# Patient Record
Sex: FEMALE | Race: WHITE | NOT HISPANIC OR LATINO | Employment: FULL TIME | URBAN - METROPOLITAN AREA
[De-identification: names, ages, dates, MRNs, and addresses within clinical notes are randomized per-mention and may not be internally consistent; named-entity substitution may affect disease eponyms.]

---

## 2018-09-05 ENCOUNTER — APPOINTMENT (OUTPATIENT)
Dept: PREADMISSION TESTING | Facility: HOSPITAL | Age: 58
End: 2018-09-05

## 2018-09-05 ENCOUNTER — ANESTHESIA EVENT (OUTPATIENT)
Dept: PERIOP | Facility: HOSPITAL | Age: 58
End: 2018-09-05

## 2018-09-05 VITALS — HEIGHT: 66 IN | WEIGHT: 116.18 LBS | BODY MASS INDEX: 18.67 KG/M2

## 2018-09-05 LAB
DEPRECATED RDW RBC AUTO: 45.7 FL (ref 37–54)
ERYTHROCYTE [DISTWIDTH] IN BLOOD BY AUTOMATED COUNT: 13 % (ref 11.3–14.5)
GLUCOSE BLD-MCNC: 108 MG/DL (ref 70–100)
HBA1C MFR BLD: 5.9 % (ref 4.8–5.6)
HCT VFR BLD AUTO: 44.6 % (ref 34.5–44)
HGB BLD-MCNC: 14.6 G/DL (ref 11.5–15.5)
MCH RBC QN AUTO: 30.9 PG (ref 27–31)
MCHC RBC AUTO-ENTMCNC: 32.7 G/DL (ref 32–36)
MCV RBC AUTO: 94.5 FL (ref 80–99)
PLATELET # BLD AUTO: 300 10*3/MM3 (ref 150–450)
PMV BLD AUTO: 11 FL (ref 6–12)
RBC # BLD AUTO: 4.72 10*6/MM3 (ref 3.89–5.14)
WBC NRBC COR # BLD: 7.91 10*3/MM3 (ref 3.5–10.8)

## 2018-09-05 PROCEDURE — 82947 ASSAY GLUCOSE BLOOD QUANT: CPT | Performed by: ORTHOPAEDIC SURGERY

## 2018-09-05 PROCEDURE — 83036 HEMOGLOBIN GLYCOSYLATED A1C: CPT | Performed by: ORTHOPAEDIC SURGERY

## 2018-09-05 PROCEDURE — 36415 COLL VENOUS BLD VENIPUNCTURE: CPT

## 2018-09-05 PROCEDURE — 85027 COMPLETE CBC AUTOMATED: CPT | Performed by: ANESTHESIOLOGY

## 2018-09-05 RX ORDER — PHENOL 1.4 %
1200 AEROSOL, SPRAY (ML) MUCOUS MEMBRANE DAILY
COMMUNITY

## 2018-09-05 RX ORDER — FAMOTIDINE 10 MG/ML
20 INJECTION, SOLUTION INTRAVENOUS ONCE
Status: CANCELLED | OUTPATIENT
Start: 2018-09-05 | End: 2018-09-05

## 2018-09-05 RX ORDER — MELATONIN
2000 DAILY
COMMUNITY

## 2018-09-05 NOTE — DISCHARGE INSTRUCTIONS

## 2018-09-06 ENCOUNTER — APPOINTMENT (OUTPATIENT)
Dept: GENERAL RADIOLOGY | Facility: HOSPITAL | Age: 58
End: 2018-09-06

## 2018-09-06 ENCOUNTER — HOSPITAL ENCOUNTER (INPATIENT)
Facility: HOSPITAL | Age: 58
LOS: 3 days | Discharge: HOME | End: 2018-09-09
Attending: ORTHOPAEDIC SURGERY | Admitting: ORTHOPAEDIC SURGERY

## 2018-09-06 ENCOUNTER — ANESTHESIA (OUTPATIENT)
Dept: PERIOP | Facility: HOSPITAL | Age: 58
End: 2018-09-06

## 2018-09-06 DIAGNOSIS — Z74.09 IMPAIRED FUNCTIONAL MOBILITY, BALANCE, GAIT, AND ENDURANCE: Primary | ICD-10-CM

## 2018-09-06 DIAGNOSIS — Z78.9 IMPAIRED MOBILITY AND ADLS: ICD-10-CM

## 2018-09-06 DIAGNOSIS — Z74.09 IMPAIRED MOBILITY AND ADLS: ICD-10-CM

## 2018-09-06 DIAGNOSIS — Z98.1 S/P LUMBAR FUSION: ICD-10-CM

## 2018-09-06 PROBLEM — Z72.0 TOBACCO ABUSE: Status: ACTIVE | Noted: 2018-09-06

## 2018-09-06 PROBLEM — R73.03 PREDIABETES: Status: ACTIVE | Noted: 2018-09-06

## 2018-09-06 PROBLEM — M54.9 BACK PAIN: Status: ACTIVE | Noted: 2018-09-06

## 2018-09-06 LAB
GLUCOSE BLDC GLUCOMTR-MCNC: 129 MG/DL (ref 70–130)
GLUCOSE BLDC GLUCOMTR-MCNC: 291 MG/DL (ref 70–130)

## 2018-09-06 PROCEDURE — 25010000002 PROPOFOL 10 MG/ML EMULSION: Performed by: NURSE ANESTHETIST, CERTIFIED REGISTERED

## 2018-09-06 PROCEDURE — C1713 ANCHOR/SCREW BN/BN,TIS/BN: HCPCS | Performed by: ORTHOPAEDIC SURGERY

## 2018-09-06 PROCEDURE — 97161 PT EVAL LOW COMPLEX 20 MIN: CPT

## 2018-09-06 PROCEDURE — 25010000003 CEFAZOLIN IN DEXTROSE 2-4 GM/100ML-% SOLUTION: Performed by: ORTHOPAEDIC SURGERY

## 2018-09-06 PROCEDURE — 25810000003 SODIUM CHLORIDE 0.9 % WITH KCL 20 MEQ 20-0.9 MEQ/L-% SOLUTION: Performed by: ORTHOPAEDIC SURGERY

## 2018-09-06 PROCEDURE — 25010000002 ONDANSETRON PER 1 MG: Performed by: NURSE ANESTHETIST, CERTIFIED REGISTERED

## 2018-09-06 PROCEDURE — 76000 FLUOROSCOPY <1 HR PHYS/QHP: CPT

## 2018-09-06 PROCEDURE — 82962 GLUCOSE BLOOD TEST: CPT

## 2018-09-06 PROCEDURE — 76001 HC FLUORO GREATER THAN 1 HOUR: CPT

## 2018-09-06 PROCEDURE — 25010000002 ONDANSETRON PER 1 MG: Performed by: ORTHOPAEDIC SURGERY

## 2018-09-06 PROCEDURE — 97116 GAIT TRAINING THERAPY: CPT

## 2018-09-06 PROCEDURE — 25010000002 FENTANYL CITRATE (PF) 100 MCG/2ML SOLUTION: Performed by: NURSE ANESTHETIST, CERTIFIED REGISTERED

## 2018-09-06 PROCEDURE — 25010000002 DEXAMETHASONE PER 1 MG: Performed by: NURSE ANESTHETIST, CERTIFIED REGISTERED

## 2018-09-06 PROCEDURE — 25010000002 HYDROMORPHONE PER 4 MG: Performed by: NURSE ANESTHETIST, CERTIFIED REGISTERED

## 2018-09-06 PROCEDURE — 25010000002 MORPHINE PER 10 MG: Performed by: ORTHOPAEDIC SURGERY

## 2018-09-06 PROCEDURE — 25010000002 NEOSTIGMINE 10 MG/10ML SOLUTION: Performed by: NURSE ANESTHETIST, CERTIFIED REGISTERED

## 2018-09-06 PROCEDURE — 63710000001 INSULIN LISPRO (HUMAN) PER 5 UNITS: Performed by: NURSE PRACTITIONER

## 2018-09-06 DEVICE — SPACR OPAL REVOLVE 9X28X10MM: Type: IMPLANTABLE DEVICE | Site: SPINE LUMBAR | Status: FUNCTIONAL

## 2018-09-06 DEVICE — ALLOGRFT BONE VIVIGEN CELLUAR MATRX FORMABLE 5CC: Type: IMPLANTABLE DEVICE | Site: SPINE LUMBAR | Status: FUNCTIONAL

## 2018-09-06 DEVICE — SCRW CORT VIPER PLS5.5 TI FIX 6X45MM: Type: IMPLANTABLE DEVICE | Site: SPINE LUMBAR | Status: FUNCTIONAL

## 2018-09-06 DEVICE — WAX BONE HEMO AESCULAP 2.5GM: Type: IMPLANTABLE DEVICE | Site: SPINE LUMBAR | Status: FUNCTIONAL

## 2018-09-06 DEVICE — IMPLANTABLE DEVICE: Type: IMPLANTABLE DEVICE | Site: SPINE LUMBAR | Status: FUNCTIONAL

## 2018-09-06 DEVICE — SCRW VIPER INNR ST: Type: IMPLANTABLE DEVICE | Site: SPINE LUMBAR | Status: FUNCTIONAL

## 2018-09-06 DEVICE — ROD PREBNT SPINE EXPEDIUM W/LINE TI 5.5X75MM: Type: IMPLANTABLE DEVICE | Site: SPINE LUMBAR | Status: FUNCTIONAL

## 2018-09-06 RX ORDER — ONDANSETRON 2 MG/ML
INJECTION INTRAMUSCULAR; INTRAVENOUS AS NEEDED
Status: DISCONTINUED | OUTPATIENT
Start: 2018-09-06 | End: 2018-09-06 | Stop reason: SURG

## 2018-09-06 RX ORDER — NALOXONE HCL 0.4 MG/ML
0.4 VIAL (ML) INJECTION
Status: DISCONTINUED | OUTPATIENT
Start: 2018-09-06 | End: 2018-09-09 | Stop reason: HOSPADM

## 2018-09-06 RX ORDER — HYDROMORPHONE HYDROCHLORIDE 1 MG/ML
0.5 INJECTION, SOLUTION INTRAMUSCULAR; INTRAVENOUS; SUBCUTANEOUS
Status: COMPLETED | OUTPATIENT
Start: 2018-09-06 | End: 2018-09-06

## 2018-09-06 RX ORDER — ONDANSETRON 2 MG/ML
4 INJECTION INTRAMUSCULAR; INTRAVENOUS EVERY 6 HOURS PRN
Status: DISCONTINUED | OUTPATIENT
Start: 2018-09-06 | End: 2018-09-09 | Stop reason: HOSPADM

## 2018-09-06 RX ORDER — CEFAZOLIN SODIUM 2 G/100ML
2 INJECTION, SOLUTION INTRAVENOUS EVERY 8 HOURS
Status: COMPLETED | OUTPATIENT
Start: 2018-09-06 | End: 2018-09-07

## 2018-09-06 RX ORDER — BUPIVACAINE HYDROCHLORIDE AND EPINEPHRINE 2.5; 5 MG/ML; UG/ML
INJECTION, SOLUTION INFILTRATION; PERINEURAL AS NEEDED
Status: DISCONTINUED | OUTPATIENT
Start: 2018-09-06 | End: 2018-09-06 | Stop reason: HOSPADM

## 2018-09-06 RX ORDER — PROPOFOL 10 MG/ML
VIAL (ML) INTRAVENOUS AS NEEDED
Status: DISCONTINUED | OUTPATIENT
Start: 2018-09-06 | End: 2018-09-06 | Stop reason: SURG

## 2018-09-06 RX ORDER — SODIUM CHLORIDE AND POTASSIUM CHLORIDE 150; 900 MG/100ML; MG/100ML
100 INJECTION, SOLUTION INTRAVENOUS CONTINUOUS
Status: DISCONTINUED | OUTPATIENT
Start: 2018-09-06 | End: 2018-09-09 | Stop reason: HOSPADM

## 2018-09-06 RX ORDER — DEXAMETHASONE SODIUM PHOSPHATE 4 MG/ML
INJECTION, SOLUTION INTRA-ARTICULAR; INTRALESIONAL; INTRAMUSCULAR; INTRAVENOUS; SOFT TISSUE AS NEEDED
Status: DISCONTINUED | OUTPATIENT
Start: 2018-09-06 | End: 2018-09-06 | Stop reason: SURG

## 2018-09-06 RX ORDER — BISACODYL 10 MG
10 SUPPOSITORY, RECTAL RECTAL DAILY PRN
Status: DISCONTINUED | OUTPATIENT
Start: 2018-09-06 | End: 2018-09-09 | Stop reason: HOSPADM

## 2018-09-06 RX ORDER — NICOTINE 21 MG/24HR
1 PATCH, TRANSDERMAL 24 HOURS TRANSDERMAL
Status: DISCONTINUED | OUTPATIENT
Start: 2018-09-06 | End: 2018-09-09 | Stop reason: HOSPADM

## 2018-09-06 RX ORDER — LIDOCAINE HYDROCHLORIDE 10 MG/ML
0.5 INJECTION, SOLUTION EPIDURAL; INFILTRATION; INTRACAUDAL; PERINEURAL ONCE AS NEEDED
Status: COMPLETED | OUTPATIENT
Start: 2018-09-06 | End: 2018-09-06

## 2018-09-06 RX ORDER — OXYCODONE AND ACETAMINOPHEN 7.5; 325 MG/1; MG/1
1 TABLET ORAL EVERY 4 HOURS PRN
Status: DISCONTINUED | OUTPATIENT
Start: 2018-09-06 | End: 2018-09-07

## 2018-09-06 RX ORDER — ROCURONIUM BROMIDE 10 MG/ML
INJECTION, SOLUTION INTRAVENOUS AS NEEDED
Status: DISCONTINUED | OUTPATIENT
Start: 2018-09-06 | End: 2018-09-06 | Stop reason: SURG

## 2018-09-06 RX ORDER — MEPERIDINE HYDROCHLORIDE 25 MG/ML
12.5 INJECTION INTRAMUSCULAR; INTRAVENOUS; SUBCUTANEOUS
Status: DISCONTINUED | OUTPATIENT
Start: 2018-09-06 | End: 2018-09-06

## 2018-09-06 RX ORDER — LABETALOL HYDROCHLORIDE 5 MG/ML
10 INJECTION, SOLUTION INTRAVENOUS EVERY 4 HOURS PRN
Status: DISCONTINUED | OUTPATIENT
Start: 2018-09-06 | End: 2018-09-09 | Stop reason: HOSPADM

## 2018-09-06 RX ORDER — ACETAMINOPHEN 650 MG/1
650 SUPPOSITORY RECTAL ONCE AS NEEDED
Status: DISCONTINUED | OUTPATIENT
Start: 2018-09-06 | End: 2018-09-06

## 2018-09-06 RX ORDER — OXYCODONE AND ACETAMINOPHEN 7.5; 325 MG/1; MG/1
1 TABLET ORAL ONCE AS NEEDED
Status: DISCONTINUED | OUTPATIENT
Start: 2018-09-06 | End: 2018-09-06

## 2018-09-06 RX ORDER — PROMETHAZINE HYDROCHLORIDE 25 MG/ML
6.25 INJECTION, SOLUTION INTRAMUSCULAR; INTRAVENOUS ONCE AS NEEDED
Status: DISCONTINUED | OUTPATIENT
Start: 2018-09-06 | End: 2018-09-06

## 2018-09-06 RX ORDER — OXYCODONE HCL 10 MG/1
10 TABLET, FILM COATED, EXTENDED RELEASE ORAL ONCE
Status: COMPLETED | OUTPATIENT
Start: 2018-09-06 | End: 2018-09-06

## 2018-09-06 RX ORDER — BISACODYL 5 MG/1
5 TABLET, DELAYED RELEASE ORAL DAILY PRN
Status: DISCONTINUED | OUTPATIENT
Start: 2018-09-06 | End: 2018-09-09 | Stop reason: HOSPADM

## 2018-09-06 RX ORDER — SODIUM CHLORIDE 0.9 % (FLUSH) 0.9 %
1-10 SYRINGE (ML) INJECTION AS NEEDED
Status: DISCONTINUED | OUTPATIENT
Start: 2018-09-06 | End: 2018-09-09 | Stop reason: HOSPADM

## 2018-09-06 RX ORDER — MAGNESIUM HYDROXIDE 1200 MG/15ML
LIQUID ORAL AS NEEDED
Status: DISCONTINUED | OUTPATIENT
Start: 2018-09-06 | End: 2018-09-06 | Stop reason: HOSPADM

## 2018-09-06 RX ORDER — NICOTINE POLACRILEX 4 MG
15 LOZENGE BUCCAL
Status: DISCONTINUED | OUTPATIENT
Start: 2018-09-06 | End: 2018-09-09 | Stop reason: HOSPADM

## 2018-09-06 RX ORDER — NEOSTIGMINE METHYLSULFATE 1 MG/ML
INJECTION, SOLUTION INTRAVENOUS AS NEEDED
Status: DISCONTINUED | OUTPATIENT
Start: 2018-09-06 | End: 2018-09-06 | Stop reason: SURG

## 2018-09-06 RX ORDER — FAMOTIDINE 20 MG/1
20 TABLET, FILM COATED ORAL EVERY 12 HOURS SCHEDULED
Status: DISCONTINUED | OUTPATIENT
Start: 2018-09-06 | End: 2018-09-09 | Stop reason: HOSPADM

## 2018-09-06 RX ORDER — CEFAZOLIN SODIUM 2 G/100ML
2 INJECTION, SOLUTION INTRAVENOUS ONCE
Status: COMPLETED | OUTPATIENT
Start: 2018-09-06 | End: 2018-09-06

## 2018-09-06 RX ORDER — MORPHINE SULFATE 4 MG/ML
1 INJECTION, SOLUTION INTRAMUSCULAR; INTRAVENOUS EVERY 4 HOURS PRN
Status: DISCONTINUED | OUTPATIENT
Start: 2018-09-06 | End: 2018-09-09 | Stop reason: HOSPADM

## 2018-09-06 RX ORDER — ACETAMINOPHEN 325 MG/1
650 TABLET ORAL EVERY 4 HOURS PRN
Status: DISCONTINUED | OUTPATIENT
Start: 2018-09-06 | End: 2018-09-09 | Stop reason: HOSPADM

## 2018-09-06 RX ORDER — SODIUM CHLORIDE, SODIUM LACTATE, POTASSIUM CHLORIDE, CALCIUM CHLORIDE 600; 310; 30; 20 MG/100ML; MG/100ML; MG/100ML; MG/100ML
9 INJECTION, SOLUTION INTRAVENOUS CONTINUOUS
Status: DISCONTINUED | OUTPATIENT
Start: 2018-09-06 | End: 2018-09-09 | Stop reason: HOSPADM

## 2018-09-06 RX ORDER — FAMOTIDINE 20 MG/1
20 TABLET, FILM COATED ORAL ONCE
Status: COMPLETED | OUTPATIENT
Start: 2018-09-06 | End: 2018-09-06

## 2018-09-06 RX ORDER — VECURONIUM BROMIDE 1 MG/ML
INJECTION, POWDER, LYOPHILIZED, FOR SOLUTION INTRAVENOUS AS NEEDED
Status: DISCONTINUED | OUTPATIENT
Start: 2018-09-06 | End: 2018-09-06 | Stop reason: SURG

## 2018-09-06 RX ORDER — ONDANSETRON 2 MG/ML
4 INJECTION INTRAMUSCULAR; INTRAVENOUS ONCE AS NEEDED
Status: DISCONTINUED | OUTPATIENT
Start: 2018-09-06 | End: 2018-09-06

## 2018-09-06 RX ORDER — DEXTROSE MONOHYDRATE 25 G/50ML
25 INJECTION, SOLUTION INTRAVENOUS
Status: DISCONTINUED | OUTPATIENT
Start: 2018-09-06 | End: 2018-09-09 | Stop reason: HOSPADM

## 2018-09-06 RX ORDER — ACETAMINOPHEN 325 MG/1
650 TABLET ORAL ONCE AS NEEDED
Status: DISCONTINUED | OUTPATIENT
Start: 2018-09-06 | End: 2018-09-06

## 2018-09-06 RX ORDER — IPRATROPIUM BROMIDE AND ALBUTEROL SULFATE 2.5; .5 MG/3ML; MG/3ML
3 SOLUTION RESPIRATORY (INHALATION) ONCE AS NEEDED
Status: DISCONTINUED | OUTPATIENT
Start: 2018-09-06 | End: 2018-09-06

## 2018-09-06 RX ORDER — PROMETHAZINE HYDROCHLORIDE 25 MG/1
25 SUPPOSITORY RECTAL ONCE AS NEEDED
Status: DISCONTINUED | OUTPATIENT
Start: 2018-09-06 | End: 2018-09-06

## 2018-09-06 RX ORDER — PREGABALIN 75 MG/1
75 CAPSULE ORAL ONCE
Status: COMPLETED | OUTPATIENT
Start: 2018-09-06 | End: 2018-09-06

## 2018-09-06 RX ORDER — PROMETHAZINE HYDROCHLORIDE 25 MG/1
25 TABLET ORAL ONCE AS NEEDED
Status: DISCONTINUED | OUTPATIENT
Start: 2018-09-06 | End: 2018-09-06

## 2018-09-06 RX ORDER — LIDOCAINE HYDROCHLORIDE 10 MG/ML
INJECTION, SOLUTION EPIDURAL; INFILTRATION; INTRACAUDAL; PERINEURAL AS NEEDED
Status: DISCONTINUED | OUTPATIENT
Start: 2018-09-06 | End: 2018-09-06 | Stop reason: SURG

## 2018-09-06 RX ORDER — ACETAMINOPHEN 500 MG
1000 TABLET ORAL ONCE
Status: COMPLETED | OUTPATIENT
Start: 2018-09-06 | End: 2018-09-06

## 2018-09-06 RX ORDER — FENTANYL CITRATE 50 UG/ML
50 INJECTION, SOLUTION INTRAMUSCULAR; INTRAVENOUS
Status: DISCONTINUED | OUTPATIENT
Start: 2018-09-06 | End: 2018-09-06

## 2018-09-06 RX ORDER — FENTANYL CITRATE 50 UG/ML
INJECTION, SOLUTION INTRAMUSCULAR; INTRAVENOUS AS NEEDED
Status: DISCONTINUED | OUTPATIENT
Start: 2018-09-06 | End: 2018-09-06 | Stop reason: SURG

## 2018-09-06 RX ORDER — SODIUM CHLORIDE 0.9 % (FLUSH) 0.9 %
1-10 SYRINGE (ML) INJECTION AS NEEDED
Status: DISCONTINUED | OUTPATIENT
Start: 2018-09-06 | End: 2018-09-06 | Stop reason: HOSPADM

## 2018-09-06 RX ORDER — GLYCOPYRROLATE 0.2 MG/ML
INJECTION INTRAMUSCULAR; INTRAVENOUS AS NEEDED
Status: DISCONTINUED | OUTPATIENT
Start: 2018-09-06 | End: 2018-09-06 | Stop reason: SURG

## 2018-09-06 RX ORDER — ZOLPIDEM TARTRATE 5 MG/1
5 TABLET ORAL NIGHTLY PRN
Status: DISCONTINUED | OUTPATIENT
Start: 2018-09-06 | End: 2018-09-09 | Stop reason: HOSPADM

## 2018-09-06 RX ADMIN — OXYCODONE HYDROCHLORIDE 10 MG: 10 TABLET, FILM COATED, EXTENDED RELEASE ORAL at 07:18

## 2018-09-06 RX ADMIN — EPHEDRINE SULFATE 10 MG: 50 INJECTION INTRAMUSCULAR; INTRAVENOUS; SUBCUTANEOUS at 09:53

## 2018-09-06 RX ADMIN — PREGABALIN 75 MG: 75 CAPSULE ORAL at 07:17

## 2018-09-06 RX ADMIN — INSULIN LISPRO 4 UNITS: 100 INJECTION, SOLUTION INTRAVENOUS; SUBCUTANEOUS at 17:55

## 2018-09-06 RX ADMIN — DEXAMETHASONE SODIUM PHOSPHATE 8 MG: 4 INJECTION, SOLUTION INTRAMUSCULAR; INTRAVENOUS at 08:14

## 2018-09-06 RX ADMIN — HYDROMORPHONE HYDROCHLORIDE 0.5 MG: 1 INJECTION, SOLUTION INTRAMUSCULAR; INTRAVENOUS; SUBCUTANEOUS at 11:56

## 2018-09-06 RX ADMIN — HYDROMORPHONE HYDROCHLORIDE 0.5 MG: 1 INJECTION, SOLUTION INTRAMUSCULAR; INTRAVENOUS; SUBCUTANEOUS at 12:18

## 2018-09-06 RX ADMIN — MORPHINE SULFATE 1 MG: 4 INJECTION, SOLUTION INTRAMUSCULAR; INTRAVENOUS at 17:55

## 2018-09-06 RX ADMIN — FENTANYL CITRATE 50 MCG: 50 INJECTION, SOLUTION INTRAMUSCULAR; INTRAVENOUS at 11:12

## 2018-09-06 RX ADMIN — FAMOTIDINE 20 MG: 20 TABLET ORAL at 20:17

## 2018-09-06 RX ADMIN — HYDROMORPHONE HYDROCHLORIDE 0.5 MG: 1 INJECTION, SOLUTION INTRAMUSCULAR; INTRAVENOUS; SUBCUTANEOUS at 12:10

## 2018-09-06 RX ADMIN — EPHEDRINE SULFATE 10 MG: 50 INJECTION INTRAMUSCULAR; INTRAVENOUS; SUBCUTANEOUS at 09:15

## 2018-09-06 RX ADMIN — VECURONIUM BROMIDE 4 MG: 1 INJECTION, POWDER, LYOPHILIZED, FOR SOLUTION INTRAVENOUS at 08:47

## 2018-09-06 RX ADMIN — HYDROMORPHONE HYDROCHLORIDE 0.5 MG: 1 INJECTION, SOLUTION INTRAMUSCULAR; INTRAVENOUS; SUBCUTANEOUS at 11:46

## 2018-09-06 RX ADMIN — OXYCODONE HYDROCHLORIDE AND ACETAMINOPHEN 1 TABLET: 7.5; 325 TABLET ORAL at 18:42

## 2018-09-06 RX ADMIN — MORPHINE SULFATE 1 MG: 4 INJECTION, SOLUTION INTRAMUSCULAR; INTRAVENOUS at 12:54

## 2018-09-06 RX ADMIN — FENTANYL CITRATE 100 MCG: 50 INJECTION, SOLUTION INTRAMUSCULAR; INTRAVENOUS at 08:03

## 2018-09-06 RX ADMIN — POTASSIUM CHLORIDE AND SODIUM CHLORIDE 100 ML/HR: 900; 150 INJECTION, SOLUTION INTRAVENOUS at 14:40

## 2018-09-06 RX ADMIN — CEFAZOLIN SODIUM 2 G: 2 INJECTION, SOLUTION INTRAVENOUS at 07:59

## 2018-09-06 RX ADMIN — MUPIROCIN 10 APPLICATION: 20 OINTMENT TOPICAL at 07:18

## 2018-09-06 RX ADMIN — EPHEDRINE SULFATE 10 MG: 50 INJECTION INTRAMUSCULAR; INTRAVENOUS; SUBCUTANEOUS at 10:17

## 2018-09-06 RX ADMIN — CEFAZOLIN SODIUM 2 G: 2 INJECTION, SOLUTION INTRAVENOUS at 16:14

## 2018-09-06 RX ADMIN — LIDOCAINE HYDROCHLORIDE 50 MG: 10 INJECTION, SOLUTION EPIDURAL; INFILTRATION; INTRACAUDAL; PERINEURAL at 08:03

## 2018-09-06 RX ADMIN — ONDANSETRON 4 MG: 2 INJECTION INTRAMUSCULAR; INTRAVENOUS at 16:14

## 2018-09-06 RX ADMIN — NICOTINE 1 PATCH: 21 PATCH, EXTENDED RELEASE TRANSDERMAL at 14:40

## 2018-09-06 RX ADMIN — NEOSTIGMINE METHYLSULFATE 3 MG: 1 INJECTION, SOLUTION INTRAVENOUS at 11:17

## 2018-09-06 RX ADMIN — SODIUM CHLORIDE, POTASSIUM CHLORIDE, SODIUM LACTATE AND CALCIUM CHLORIDE 9 ML/HR: 600; 310; 30; 20 INJECTION, SOLUTION INTRAVENOUS at 07:05

## 2018-09-06 RX ADMIN — FAMOTIDINE 20 MG: 20 TABLET, FILM COATED ORAL at 07:17

## 2018-09-06 RX ADMIN — LIDOCAINE HYDROCHLORIDE 0.2 ML: 10 INJECTION, SOLUTION EPIDURAL; INFILTRATION; INTRACAUDAL; PERINEURAL at 07:05

## 2018-09-06 RX ADMIN — GLYCOPYRROLATE 0.4 MG: 0.2 INJECTION, SOLUTION INTRAMUSCULAR; INTRAVENOUS at 11:17

## 2018-09-06 RX ADMIN — ONDANSETRON 4 MG: 2 INJECTION INTRAMUSCULAR; INTRAVENOUS at 11:17

## 2018-09-06 RX ADMIN — ACETAMINOPHEN 1000 MG: 500 TABLET, FILM COATED ORAL at 07:17

## 2018-09-06 RX ADMIN — FAMOTIDINE 20 MG: 20 TABLET ORAL at 14:39

## 2018-09-06 RX ADMIN — OXYCODONE HYDROCHLORIDE AND ACETAMINOPHEN 1 TABLET: 7.5; 325 TABLET ORAL at 14:39

## 2018-09-06 RX ADMIN — ROCURONIUM BROMIDE 50 MG: 10 SOLUTION INTRAVENOUS at 08:03

## 2018-09-06 RX ADMIN — PROPOFOL 150 MG: 10 INJECTION, EMULSION INTRAVENOUS at 08:03

## 2018-09-06 RX ADMIN — FENTANYL CITRATE 50 MCG: 50 INJECTION, SOLUTION INTRAMUSCULAR; INTRAVENOUS at 11:18

## 2018-09-06 NOTE — H&P
"Pre-Op H&P  Marlen Balderrama  [id number]  1960    Chief complaint: Low back pain into RLE, now with some into LLE    HPI:    Patient is a 58 y.o.female who presents with spinal stenosis and here today for lumbar laminectomy and fusion     Review of Systems:  General ROS: negative for chills, fever or skin lesions;  No changes since last office visit  Cardiovascular ROS: no chest pain or dyspnea on exertion  Respiratory ROS: no cough, shortness of breath, or wheezing    Allergies:   Allergies   Allergen Reactions   • Medrol [Methylprednisolone] Other (See Comments)     \"STEROIDS MAKE MY JAW HURT\"       Home Meds:    No current facility-administered medications on file prior to encounter.      No current outpatient prescriptions on file prior to encounter.       PMH:   Past Medical History:   Diagnosis Date   • Back pain    • Spinal stenosis      PSH:    Past Surgical History:   Procedure Laterality Date   • NO PAST SURGERIES         Social History:   Tobacco:   History   Smoking Status   • Current Some Day Smoker   • Packs/day: 1.00   • Years: 44.00   • Types: Cigarettes   Smokeless Tobacco   • Never Used     Comment: DOWN TO 3 CIGARETTES PER DAY      Alcohol:     History   Alcohol Use No       Vitals:          Afebrile HR 83 O2 97% /80  Physical Exam:  General Appearance:    Alert, cooperative, no distress, appears stated age   Head:    Normocephalic, without obvious abnormality, atraumatic   Lungs:     Clear to auscultation bilaterally, respirations unlabored    Heart:   Regular rate and rhythm, S1 and S2 normal, no murmur, rub    or gallop    Abdomen:    Soft, non-tender.  +bowel sounds   Breast Exam:    deferred   Genitalia:    deferred   Extremities:   Extremities normal, atraumatic, no cyanosis or edema   Skin:   Skin color, texture, turgor normal, no rashes or lesions   Neurologic:   Grossly intact   Results Review  I reviewed the patient's new clinical results.    Cancer Staging (if " applicable)  Cancer Patient: __ yes _x_no __unknown; If yes, clinical stage T:__ N:__M:__, stage group or __N/A    Impression: Lumbar stenosis    Plan: Lumbar laminectomy and fusion    Melania Brizuela, APRN   9/6/2018   7:03 AM

## 2018-09-06 NOTE — ANESTHESIA PROCEDURE NOTES
Peripheral IV    Patient location during procedure: OR  Start time: 9/6/2018 8:55 AM  End time: 9/6/2018 8:57 AM  Line placed for Fluids/Medication Admin.  Performed By   CRNA: NOLAN NOVOA  Preanesthetic Checklist  Completed: patient identified, site marked, surgical consent, pre-op evaluation, timeout performed, IV checked, risks and benefits discussed and monitors and equipment checked  Peripheral IV Prep   Patient position: supine   Prep: alcohol swabs  Patient monitoring: heart rate, cardiac monitor and continuous pulse ox  Peripheral IV Procedure   Laterality:right  Location:  Hand  Catheter size: 18 G         Post Assessment   Dressing Type: transparent.    IV Dressing/Site: clean, dry and intact

## 2018-09-06 NOTE — OP NOTE
PREOPERATIVE DIAGNOSES:   1. Lumbar spinal stenosis L3-L4 and L4-L5.   2. 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   3. Degenerative scoliosis L3-L4-L5.   4. Severe spondylosis/disc degeneration L3-L4 and L4-L5.     POSTOPERATIVE DIAGNOSES:   1. Lumbar spinal stenosis L3-L4 and L4-L5.   2. 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   3. Degenerative scoliosis L3-L4-L5.   4. Severe spondylosis/disc degeneration L3-L4 and L4-L5.     PROCEDURES PERFORMED:   1. Decompressive laminectomy, medial facetectomy and foraminotomies to decompress neural elements at L3-L4 and L4-L5.   2. Anterior interbody fusion L3-L4 and L4-L5 with interbody fusion cage and bone graft.   3. Segmental instrumentation L3-L4-L5 with DePuy transpedicular fixation.   4. Posterolateral fusion L3-L4 and L4-L5 with bone graft.   5. Bone marrow aspiration through separate incision in the left posterior iliac crest.     SURGEON: Rogelio Martinez MD    ASSISTANT: Fiorella Epstein PA-C    ANESTHESIA: General.     DESCRIPTION OF PROCEDURE: The patient was given a preoperative dose of intravenous Ancef. She was given a general anesthetic. She was placed in the prone position on the Gage table. The back was prepped and draped sterilely. A midline incision was made. The fascia was split and the paraspinal musculature elevated. L3-L4 and L4-L5 were identified by x-ray.     Pedicle screw instrumentation was placed 1st. Under C-arm guidance I placed pedicle screws at L3, L4, and L5 on the left and right sides. Bone was somewhat soft but screws obtained reasonable purchase. Two rods contoured in lordosis were placed on the screws and the appropriate set screws applied.     Next to decompress the severe spinal stenosis laminectomies were performed at L3-L4 and L4-L5. I started at L3-L4. A midline laminectomy was performed. A flavectomy was performed. There was a moderately severe central stenosis. I decompressed on the left and  right sides. Bilateral medial facetectomies were performed at L3-L4. Foraminotomies were performed. The neural elements at L3-L4 appeared decompressed. I then moved caudal to L4-L5. A laminectomy was performed at L4 and flavectomy. There was a moderately severe central stenosis. Bilateral medial facetectomies were performed. Foraminotomies were performed bilaterally. The neural elements at L4-L5 appeared decompressed.     Next, interbody fusions were performed. I started at L3-L4. I put these on the right side because that was the concavity of the curvature. At L3-L4 an annular window was created on the right side between the exiting nerve root above and traversing nerve root medially. These nerve roots were protected with retractors. A complete discectomy was performed at L3-L4 using a combination of endplate cameron, curets and pituitaries. The cage selected for L3-L4 was 10 mm in height and 28 mm in length. Prior to placing the cage I copiously irrigated the disc interspace. Bone graft was packed in the anterior disc space. I then packed a DePuy/Synthes interbody fusion cage with bone graft. This was impacted tangentially across the disc space. It locked into place well.     I then moved caudal to L4-L5. An interbody fusion was performed here. I created an annular window on the right side between the exiting nerve root above and traversing nerve root medially. A complete diskectomy was performed using combination of curets, pituitaries, and endplate cameron. The disc space at L4-L5 was somewhat smaller. I selected a cage 9 mm in height and 28 mm in length. It was packed with bone graft. Prior to placing the cage I copiously irrigated the disc space. I placed bone graft in the anterior disc space. I then packed the cage with bone graft and impacted it tangentially across the disc space. It locked into place well. I then compressed across the screws to lock the cage in place. The construct appeared solid.     A  posterolateral fusion was performed. The transverse processes on the left and right sides of L3-L4 and L4-L5 were decorticated. Bone graft was packed in the posterolateral gutters on the right and left sides.     It should be noted that bone graft was prepared from a bone marrow aspirate taken from the left posterior iliac crest. Through a separate stab incision I aspirated about 10 mL of bone marrow aspirate. This was mixed with some Vivigen allograft as well as locally harvested bone graft.  The bone graft was morselized and this was used as the fusion material.    A final torque tightening was done of all the screws. I could palpate the medial pedicle walls and there was no violation of the medial pedicle walls detectable by a Mcgarry ball-tip probe. I placed a deep Hemovac drain. Exposed dura was covered with thrombin-soaked Gelfoam.     The wound was closed in layers using Vicryl and skin staples. Final C-arm images showed all hardware in good position. The patient was awakened and transported to recovery room in stable condition.

## 2018-09-06 NOTE — ANESTHESIA PREPROCEDURE EVALUATION
Anesthesia Evaluation     Patient summary reviewed and Nursing notes reviewed   NPO Solid Status: > 8 hours  NPO Liquid Status: > 8 hours           Airway   Mallampati: I  TM distance: >3 FB  Neck ROM: full  No difficulty expected  Dental - normal exam     Pulmonary    (+) a smoker Current,   Cardiovascular         Neuro/Psych  GI/Hepatic/Renal/Endo      Musculoskeletal     Abdominal    Substance History      OB/GYN          Other                      Anesthesia Plan    ASA 2     general     intravenous induction   Anesthetic plan, all risks, benefits, and alternatives have been provided, discussed and informed consent has been obtained with: patient.    Plan discussed with CRNA.

## 2018-09-06 NOTE — ANESTHESIA PROCEDURE NOTES
Airway  Urgency: elective    Date/Time: 9/6/2018 8:03 AM  End Time:9/6/2018 8:06 AM  Airway not difficult    General Information and Staff    Patient location during procedure: OR  CRNA: NOLAN NOVOA    Indications and Patient Condition  Indications for airway management: airway protection    Preoxygenated: yes  MILS not maintained throughout  Mask difficulty assessment: 1 - vent by mask    Final Airway Details  Final airway type: endotracheal airway      Successful airway: ETT  Cuffed: yes   Successful intubation technique: direct laryngoscopy  Endotracheal tube insertion site: oral  Blade: Ju  Blade size: 3  ETT size: 7.0 mm  Cormack-Lehane Classification: grade I - full view of glottis  Placement verified by: chest auscultation and capnometry   Measured from: lips  ETT to lips (cm): 22  Number of attempts at approach: 1    Additional Comments  Negative epigastric sounds, Breath sound equal bilaterally with symmetric chest rise and fall

## 2018-09-06 NOTE — PLAN OF CARE
Problem: Patient Care Overview  Goal: Plan of Care Review  Outcome: Ongoing (interventions implemented as appropriate)   09/06/18 7322   Plan of Care Review   Progress improving   OTHER   Outcome Summary Patient ambulated 240 feet with RW, denied LE pain throughout eval, gait limited by pain. Recommend d/c home with family. Will continue to progress as able. Encouraged frequent ambulation.    Coping/Psychosocial   Plan of Care Reviewed With patient

## 2018-09-06 NOTE — H&P
"Patient Name: Marlen Balderrama  MRN: [id number]  : 1960  DOS: 2018    Attending: Rogelio Martinez MD    Primary Care Provider: Provider, No Known      Chief complaint:  Low back pain     Subjective   Patient is a 58 y.o. female presented for lumbar fusion by Dr. Martinez under [place]. She tolerated surgery well and is admitted for further medical management. Her back has been painful for several months with pain, numbness and tingling down the right leg. She denies saddle anesthesia.     PROCEDURES PERFORMED:   1. Decompressive laminectomy, medial facetectomy and foraminotomies to decompress neural elements at L3-L4 and L4-L5.   2. Anterior interbody fusion L3-L4 and L4-L5 with interbody fusion cage and bone graft.   3. Segmental instrumentation L3-L4-L5 with DePuy transpedicular fixation.   4. Posterolateral fusion L3-L4 and L4-L5 with bone graft.   5. Bone marrow aspiration through separate incision in the left posterior iliac crest.     When seen postop she is doing well. Her pain control is good. She denies nausea, shortness of breath or chest pain. No hx of DVT or PE.    ( Above is noted/ agree. Seen in her room afterwards. Doing ok. No f/c/n/vom/sob. Some postop pain. Noted that afterwards she ambulated 240 ft with PT with RW. No c/o LE pain.)wy.      Allergies:  Allergies   Allergen Reactions   • Medrol [Methylprednisolone] Other (See Comments)     \"STEROIDS MAKE MY JAW HURT\"       Meds:  Prescriptions Prior to Admission   Medication Sig Dispense Refill Last Dose   • b complex-C-folic acid 1 MG capsule Take 1 capsule by mouth Daily.   2018   • calcium carbonate (OS-KORY) 600 MG tablet Take 1,200 mg by mouth Daily.   2018   • cholecalciferol (VITAMIN D3) 1000 units tablet Take 2,000 Units by mouth Daily.   2018       History:   Past Medical History:   Diagnosis Date   • Back pain    • Spinal stenosis      Past Surgical History:   Procedure Laterality Date   • NO PAST SURGERIES       History " reviewed. No pertinent family history.  Social History   Substance Use Topics   • Smoking status: Current Some Day Smoker     Packs/day: 1.00     Years: 44.00     Types: Cigarettes   • Smokeless tobacco: Never Used      Comment: DOWN TO 3 CIGARETTES PER DAY   • Alcohol use No   She is  with no children. She does factory work.    Review of Systems  Pertinent items are noted in HPI, all other systems reviewed and negative    Vital Signs  BP 95/50   Pulse 66   Temp 97.2 °F (36.2 °C)   Resp 16   SpO2 97%     Physical Exam:    General Appearance:    Alert, cooperative, in no acute distress   Head:    Normocephalic, without obvious abnormality, atraumatic   Eyes:            Lids and lashes normal, conjunctivae and sclerae normal, no   icterus, no pallor, corneas clear, PERRLA   Ears:    Ears appear intact with no abnormalities noted   Back:   Surgical dressing CDI. HV present   Lungs:     Clear to auscultation,respirations regular, even and                   unlabored    Heart:    Regular rhythm and normal rate, normal S1 and S2, no            murmur, no gallop, no rub, no click   Abdomen:     Normal bowel sounds, no masses, no organomegaly, soft        non-tender, non-distended, no guarding, no rebound                 tenderness   Genitalia:    Deferred. Moss- clear yellow urine   Extremities:   Moves all extremities well, no edema, no cyanosis, no              redness   Pulses:   Pulses palpable and equal bilaterally   Skin:   No bleeding, bruising or rash   Neurologic:   Cranial nerves 2 - 12 grossly intact, sensation intact. Flexion and dorsiflexion intact bilateral feet.        I reviewed the patient's new clinical results.         Results from last 7 days  Lab Units 09/05/18  1331   WBC 10*3/mm3 7.91   HEMOGLOBIN g/dL 14.6   HEMATOCRIT % 44.6*   PLATELETS 10*3/mm3 300       Results from last 7 days  Lab Units 09/05/18  1331   GLUCOSE mg/dL 108*     Lab Results   Component Value Date    HGBA1C 5.90 (H)  09/05/2018       Assessment and Plan:   Active Problems:    Back pain    S/P lumbar fusion    Tobacco abuse    Prediabetes      Plan  1. PT- ambulate  2. Pain control-prns   3. IS-encourage  4. DVT proph- mechs  5. Bowel regimen  6. Resume home medications as appropriate  7. Monitor post-op labs  8. DC planning for home    Tobacco abuse  - Nicotine patch    PreDM  - hgb A1c on 9/5/18 5.9  - Accuchecks ACHS with low dose SSI    I have personally performed the evaluation on this patient. My history is consistent  with HPI obtained. My exam findings are listed above. I have personally reviewed and discussed the above formulated treatment plan with pt, , and DENY.wy.      DENY Hudson  09/06/18  12:52 PM

## 2018-09-06 NOTE — THERAPY EVALUATION
Acute Care - Physical Therapy Initial Evaluation  [facility]     Patient Name: Marlen Balderrama  : 1960  MRN: [id number]  Today's Date: 2018   Onset of Illness/Injury or Date of Surgery: 18  Date of Referral to PT: 18  Referring Physician: MD Juan      Admit Date: 2018    Visit Dx:     ICD-10-CM ICD-9-CM   1. Impaired functional mobility, balance, gait, and endurance Z74.09 V49.89     Patient Active Problem List   Diagnosis   • Back pain   • S/P lumbar fusion   • Tobacco abuse   • Prediabetes     Past Medical History:   Diagnosis Date   • Back pain    • Spinal stenosis      Past Surgical History:   Procedure Laterality Date   • NO PAST SURGERIES          PT ASSESSMENT (last 12 hours)      Physical Therapy Evaluation     Row Name 18 1535          PT Evaluation Time/Intention    Subjective Information complains of;pain  -LR     Document Type evaluation  -LR     Mode of Treatment physical therapy;individual therapy  -LR     Patient Effort excellent  -LR     Symptoms Noted During/After Treatment increased pain  -LR     Comment Notified RN. Pre-medicated.  -LR     Row Name 18 1535          General Information    Patient Profile Reviewed? yes  -LR     Onset of Illness/Injury or Date of Surgery 18  -LR     Referring Physician MD Juan  -LR     Patient Observations alert;cooperative;agree to therapy  -LR     General Observations of Patient Patient supine in bed upon arrival. IV, O2, hemovac, tsang.   -LR     Prior Level of Function min assist:;all household mobility;gait;transfer;bed mobility;ADL's;mod assist:;home management;cooking;cleaning;dependent:;community mobility;shopping;independent:;driving   no long distances, required breaks with home management.   -LR     Equipment Currently Used at Home none  -LR     Pertinent History of Current Functional Problem Patient presents for surgical management of persistent and progressive low back and R LE pain that has failed  to improve with conservative management.   -LR     Existing Precautions/Restrictions fall;spinal  -LR     Equipment Issued to Patient --   none  -LR     Equipment Ordered for Patient --   none  -LR     Risks Reviewed patient:;LOB;nausea/vomiting;dizziness;increased discomfort;lines disloged  -LR     Benefits Reviewed patient:;improve function;increase independence;increase strength;increase balance;decrease pain;decrease risk of DVT;increase knowledge  -LR     Barriers to Rehab previous functional deficit;medically complex  -LR     Row Name 09/06/18 1535          Relationship/Environment    Primary Source of Support/Comfort spouse   available to assist at all times upon d/c home.   -LR     Lives With spouse  -LR     Row Name 09/06/18 1535          Resource/Environmental Concerns    Current Living Arrangements home/apartment/condo  -LR     Resource/Environmental Concerns none  -LR     Transportation Concerns car, none  -LR     Row Name 09/06/18 1535          Cognitive Assessment/Intervention- PT/OT    Orientation Status (Cognition) oriented x 4  -LR     Follows Commands (Cognition) WFL;follows one step commands;over 90% accuracy;verbal cues/prompting required;repetition of directions required  -LR     Safety Deficit (Cognitive) other (see comments)   none  -LR     Row Name 09/06/18 1535          Safety Issues, Functional Mobility    Safety Issues Affecting Function (Mobility) other (see comments)   none  -LR     Row Name 09/06/18 1535          Bed Mobility Assessment/Treatment    Bed Mobility Assessment/Treatment supine-sit;sit-supine  -LR     Supine-Sit Cook (Bed Mobility) verbal cues;contact guard  -LR     Sit-Supine Cook (Bed Mobility) verbal cues;contact guard  -LR     Bed Mobility, Safety Issues impaired trunk control for bed mobility  -LR     Assistive Device (Bed Mobility) head of bed elevated;bed rails  -LR     Comment (Bed Mobility) Verbal cues for correct log roll technique, to flex knees  prior to rolling to side, and to keep hips and shoulders aligned throughout. Denied dizziness upon sitting up.   -LR     Row Name 09/06/18 1535          Transfer Assessment/Treatment    Transfer Assessment/Treatment sit-stand transfer;stand-sit transfer  -LR     Comment (Transfers) Verbal cues to push up from bed to stand and to reach back for bed to lower into sitting. Verbal cues to limit trunk flexion during t/f.   -LR     Sit-Stand Morton (Transfers) verbal cues;contact guard;2 person assist  -LR     Stand-Sit [place] (Transfers) verbal cues;contact guard;2 person assist  -LR     Row Name 09/06/18 1535          Sit-Stand Transfer    Assistive Device (Sit-Stand Transfers) walker, front-wheeled  -LR     Row Name 09/06/18 1535          Stand-Sit Transfer    Assistive Device (Stand-Sit Transfers) walker, front-wheeled  -LR     Row Name 09/06/18 1535          Gait/Stairs Assessment/Training    16770 - Gait Training Minutes  8  -LR     [place] Level (Gait) verbal cues;contact guard;2 person assist  -LR     Assistive Device (Gait) walker, front-wheeled  -LR     Distance in Feet (Gait) 240  -LR     Pattern (Gait) step-through  -LR     Deviations/Abnormal Patterns (Gait) bilateral deviations;shawn decreased;gait speed decreased;stride length decreased  -LR     Bilateral Gait Deviations heel strike decreased  -LR     Comment (Gait/Stairs) Patient ambulated with step through gait pattern at slow pace with short steps. Improved with cues for increased step length, increased LE weight bearing, decreased LE weight bearing, and increased pace. Gait limited by pain. Denied leg pain throughout gait training.   -LR     Row Name 09/06/18 1535          General ROM    RT Lower Ext Comment  -LR     LT Lower Ext Comment  -LR     GENERAL ROM COMMENTS able to actively DF bilaterally  -LR     Row Name 09/06/18 1535          Right Lower Ext    RT Lower Extremity Comments R LE AROM WFL  -LR     Row Name 09/06/18 1535           Left Lower Ext    LT Lower Extremity Comments L LE AROM WFL  -LR     Row Name 09/06/18 1535          MMT (Manual Muscle Testing)    General MMT Comments B LEs functionally 4/5  -LR     Row Name 09/06/18 1535          Sensory Assessment/Intervention    Sensory General Assessment no sensation deficits identified   denies numbness/tingling;light touch equal and intact  -LR     Row Name 09/06/18 1535          Vision Assessment/Intervention    Visual Impairment/Limitations corrective lenses full time  -LR     Row Name 09/06/18 1535          Pain Assessment    Additional Documentation Pain Scale: Numbers Pre/Post-Treatment (Group)  -LR     Row Name 09/06/18 1535          Pain Scale: Numbers Pre/Post-Treatment    Pain Scale: Numbers, Pretreatment 7/10  -LR     Pain Scale: Numbers, Post-Treatment 8/10  -LR     Pain Location - Orientation lower  -LR     Pain Location back  -LR     Pain Intervention(s) Repositioned;Ambulation/increased activity  -LR     Row Name             Wound 09/06/18 1130 Other (See comments) back incision    Wound - Properties Group Date first assessed: 09/06/18  -TK Time first assessed: 1130  -TK Side: Other (See comments)  -TK Location: back  -TK Type: incision  -TK    Row Name 09/06/18 1535          Coping    Observed Emotional State accepting;cooperative  -LR     Verbalized Emotional State acceptance  -LR     Row Name 09/06/18 1535          Plan of Care Review    Plan of Care Reviewed With patient  -LR     Row Name 09/06/18 1535          Physical Therapy Clinical Impression    Date of Referral to PT 09/06/18  -LR     PT Diagnosis (PT Clinical Impression) lumbar spinal stenosis L3-4, L4-5, segmental instability L3-4, L4-5, with lateral listhesis, degenerative scoliosis L3-4-5, severe spondylolisthesis/disc degeneration L3-4, L4-5/ s/p decompressive laminectomy, medial facetectomy and foraminotomies to decompress neural elements at L3-4, L4-5, anterior interbody fusion L3-L4, L4-L5 with  interbody fusion cage, segmental instrumentation L3-4-5 with transpedicular fixation, posterolateral fusion L3-4, L4-5, bone marrow aspiration L posterior iliac crest  -LR     Prognosis (PT Clinical Impression) good  -LR     Patient/Family Goals Statement (PT Clinical Impression) go home, decrease pain  -LR     Criteria for Skilled Interventions Met (PT Clinical Impression) yes;treatment indicated  -LR     Rehab Potential (PT Clinical Summary) good, to achieve stated therapy goals  -LR     Care Plan Review (PT) evaluation/treatment results reviewed;care plan/treatment goals reviewed;risks/benefits reviewed;current/potential barriers reviewed;patient/other agree to care plan  -LR     Row Name 09/06/18 1535          Physical Therapy Goals    Bed Mobility Goal Selection (PT) bed mobility, PT goal 1  -LR     Transfer Goal Selection (PT) transfer, PT goal 1  -LR     Gait Training Goal Selection (PT) gait training, PT goal 1  -LR     Stairs Goal Selection (PT) stairs, PT goal 1  -LR     Additional Documentation Stairs Goal Selection (PT) (Row)  -LR     Row Name 09/06/18 1533          Bed Mobility Goal 1 (PT)    Activity/Assistive Device (Bed Mobility Goal 1, PT) sit to supine/supine to sit  -LR     [place] Level/Cues Needed (Bed Mobility Goal 1, PT) conditional independence  -LR     Time Frame (Bed Mobility Goal 1, PT) long term goal (LTG);3 days  -LR     Progress/Outcomes (Bed Mobility Goal 1, PT) goal ongoing  -LR     Row Name 09/06/18 1539          Transfer Goal 1 (PT)    Activity/Assistive Device (Transfer Goal 1, PT) sit-to-stand/stand-to-sit;walker, rolling  -LR     [place] Level/Cues Needed (Transfer Goal 1, PT) conditional independence  -LR     Time Frame (Transfer Goal 1, PT) long term goal (LTG);3 days  -LR     Progress/Outcome (Transfer Goal 1, PT) goal ongoing  -LR     Row Name 09/06/18 1532          Gait Training Goal 1 (PT)    Activity/Assistive Device (Gait Training Goal 1, PT) gait (walking  locomotion);walker, rolling  -LR     [place] Level (Gait Training Goal 1, PT) conditional independence  -LR     Distance (Gait Goal 1, PT) 500 feet  -LR     Time Frame (Gait Training Goal 1, PT) long term goal (LTG);3 days  -LR     Progress/Outcome (Gait Training Goal 1, PT) goal ongoing  -LR     Row Name 09/06/18 1535          Stairs Goal 1 (PT)    Activity/Assistive Device (Stairs Goal 1, PT) ascending stairs;descending stairs;step-to-step;walker, rolling;other (see comments)   backwards  -LR     [place] Level/Cues Needed (Stairs Goal 1, PT) contact guard assist  -LR     Number of Stairs (Stairs Goal 1, PT) 1  -LR     Time Frame (Stairs Goal 1, PT) long term goal (LTG);3 days  -LR     Progress/Outcome (Stairs Goal 1, PT) goal ongoing  -LR     Row Name 09/06/18 1535          Positioning and Restraints    Pre-Treatment Position in bed  -LR     Post Treatment Position bed  -LR     Row Name 09/06/18 1535          Living Environment    Home Accessibility not wheelchair accessible;other (see comments)   walk in shower  -LR       User Key  (r) = Recorded By, (t) = Taken By, (c) = Cosigned By    Initials Name Provider Type    LR Karin Correia, PT Physical Therapist    Ani Donnelly, RN Registered Nurse          Physical Therapy Education     Title: PT OT SLP Therapies (Done)     Topic: Physical Therapy (Done)     Point: Mobility training (Done)    Learning Progress Summary     Learner Status Readiness Method Response Comment Documented by    Patient Done Acceptance SYD,TOBI,RENA RICE,NR Issued and reviewed handout regarding spinal precautions. Educated on spinal precautions, correct log rolling technique, correct gait mechanics, benefits of frequent ambulation, and progression of POC. LR 09/06/18 8607          Point: Home exercise program (Done)    Learning Progress Summary     Learner Status Readiness Method Response Comment Documented by    Patient Done Acceptance SYD,TOBI,H KRYSTAL,NR Issued and reviewed handout  regarding spinal precautions. Educated on spinal precautions, correct log rolling technique, correct gait mechanics, benefits of frequent ambulation, and progression of POC. LR 09/06/18 1627          Point: Body mechanics (Done)    Learning Progress Summary     Learner Status Readiness Method Response Comment Documented by    Patient Done Acceptance E,TOBI,H KRYSTAL,NR Issued and reviewed handout regarding spinal precautions. Educated on spinal precautions, correct log rolling technique, correct gait mechanics, benefits of frequent ambulation, and progression of POC. LR 09/06/18 1627          Point: Precautions (Done)    Learning Progress Summary     Learner Status Readiness Method Response Comment Documented by    Patient Done Acceptance E,D,H KRYSTAL,NR Issued and reviewed handout regarding spinal precautions. Educated on spinal precautions, correct log rolling technique, correct gait mechanics, benefits of frequent ambulation, and progression of POC.  09/06/18 1627                      User Key     Initials Effective Dates Name Provider Type Discipline    LR 06/19/15 -  Karin Correia, PT Physical Therapist PT                PT Recommendation and Plan  Anticipated Discharge Disposition (PT): home with assist, home with home health  Planned Therapy Interventions (PT Eval): balance training, bed mobility training, gait training, home exercise program, patient/family education, stair training, strengthening, transfer training  Therapy Frequency (PT Clinical Impression): daily  Outcome Summary/Treatment Plan (PT)  Anticipated Equipment Needs at Discharge (PT): front wheeled walker, shower chair, bedside commode  Anticipated Discharge Disposition (PT): home with assist, home with home health  Plan of Care Reviewed With: patient  Progress: improving  Outcome Summary: Patient ambulated 240 feet with RW, denied LE pain throughout eval, gait limited by pain. Recommend d/c home with family. Will continue to progress as able.  Encouraged frequent ambulation.           Outcome Measures     Row Name 09/06/18 1535             How much help from another person do you currently need...    Turning from your back to your side while in flat bed without using bedrails? 3  -LR      Moving from lying on back to sitting on the side of a flat bed without bedrails? 3  -LR      Moving to and from a bed to a chair (including a wheelchair)? 3  -LR      Standing up from a chair using your arms (e.g., wheelchair, bedside chair)? 3  -LR      Climbing 3-5 steps with a railing? 3  -LR      To walk in hospital room? 3  -LR      AM-PAC 6 Clicks Score 18  -LR         Functional Assessment    Outcome Measure Options AM-PAC 6 Clicks Basic Mobility (PT)  -LR        User Key  (r) = Recorded By, (t) = Taken By, (c) = Cosigned By    Initials Name Provider Type    LR Karin Correia, PT Physical Therapist           Time Calculation:         PT Charges     Row Name 09/06/18 1535             Time Calculation    Start Time 1535  -LR      PT Received On 09/06/18  -LR      PT Goal Re-Cert Due Date 09/16/18  -LR         Time Calculation- PT    Total Timed Code Minutes- PT 8 minute(s)  -LR         Timed Charges    14602 - Gait Training Minutes  8  -LR        User Key  (r) = Recorded By, (t) = Taken By, (c) = Cosigned By    Initials Name Provider Type    Karin Santamaria, PT Physical Therapist        Therapy Suggested Charges     Code   Minutes Charges    54735 (CPT®) Hc Pt Neuromusc Re Education Ea 15 Min      92860 (CPT®) Hc Pt Ther Proc Ea 15 Min      65813 (CPT®) Hc Gait Training Ea 15 Min 8 1    54844 (CPT®) Hc Pt Therapeutic Act Ea 15 Min      17609 (CPT®) Hc Pt Manual Therapy Ea 15 Min      22563 (CPT®) Hc Pt Iontophoresis Ea 15 Min      59559 (CPT®) Hc Pt Elec Stim Ea-Per 15 Min      95942 (CPT®) Hc Pt Ultrasound Ea 15 Min      36878 (CPT®) Hc Pt Self Care/Mgmt/Train Ea 15 Min      08046 (CPT®) Hc Pt Prosthetic (S) Train Initial Encounter, Each 15 Min       81746 (CPT®) Hc Pt Orthotic(S)/Prosthetic(S) Encounter, Each 15 Min      07687 (CPT®) Hc Orthotic(S) Mgmt/Train Initial Encounter, Each 15min      Total  8 1        Therapy Charges for Today     Code Description Service Date Service Provider Modifiers Qty    73914177013 HC GAIT TRAINING EA 15 MIN 9/6/2018 Karin Correia, PT GP 1    30513097189 HC PT THER SUPP EA 15 MIN 9/6/2018 Karin Correia, PT GP 2    06674864586 HC PT EVAL LOW COMPLEXITY 3 9/6/2018 Karin Correia, PT GP 1          PT G-Codes  Outcome Measure Options: AM-PAC 6 Clicks Basic Mobility (PT)  AM-PAC 6 Clicks Score: 18      Karin Correia, PT  9/6/2018

## 2018-09-07 PROBLEM — G89.18 ACUTE POSTOPERATIVE PAIN: Status: ACTIVE | Noted: 2018-09-07

## 2018-09-07 PROBLEM — D62 ACUTE BLOOD LOSS ANEMIA: Status: ACTIVE | Noted: 2018-09-07

## 2018-09-07 LAB
ANION GAP SERPL CALCULATED.3IONS-SCNC: 5 MMOL/L (ref 3–11)
BUN BLD-MCNC: 7 MG/DL (ref 9–23)
BUN/CREAT SERPL: 9.6 (ref 7–25)
CALCIUM SPEC-SCNC: 8.4 MG/DL (ref 8.7–10.4)
CHLORIDE SERPL-SCNC: 104 MMOL/L (ref 99–109)
CO2 SERPL-SCNC: 27 MMOL/L (ref 20–31)
CREAT BLD-MCNC: 0.73 MG/DL (ref 0.6–1.3)
DEPRECATED RDW RBC AUTO: 45.5 FL (ref 37–54)
ERYTHROCYTE [DISTWIDTH] IN BLOOD BY AUTOMATED COUNT: 13.1 % (ref 11.3–14.5)
GFR SERPL CREATININE-BSD FRML MDRD: 82 ML/MIN/1.73
GLUCOSE BLD-MCNC: 97 MG/DL (ref 70–100)
GLUCOSE BLDC GLUCOMTR-MCNC: 112 MG/DL (ref 70–130)
GLUCOSE BLDC GLUCOMTR-MCNC: 118 MG/DL (ref 70–130)
GLUCOSE BLDC GLUCOMTR-MCNC: 135 MG/DL (ref 70–130)
GLUCOSE BLDC GLUCOMTR-MCNC: 153 MG/DL (ref 70–130)
HCT VFR BLD AUTO: 30.3 % (ref 34.5–44)
HGB BLD-MCNC: 10 G/DL (ref 11.5–15.5)
MCH RBC QN AUTO: 31 PG (ref 27–31)
MCHC RBC AUTO-ENTMCNC: 33 G/DL (ref 32–36)
MCV RBC AUTO: 93.8 FL (ref 80–99)
PLATELET # BLD AUTO: 202 10*3/MM3 (ref 150–450)
PMV BLD AUTO: 10.8 FL (ref 6–12)
POTASSIUM BLD-SCNC: 4.5 MMOL/L (ref 3.5–5.5)
RBC # BLD AUTO: 3.23 10*6/MM3 (ref 3.89–5.14)
SODIUM BLD-SCNC: 136 MMOL/L (ref 132–146)
WBC NRBC COR # BLD: 10.73 10*3/MM3 (ref 3.5–10.8)

## 2018-09-07 PROCEDURE — 82962 GLUCOSE BLOOD TEST: CPT

## 2018-09-07 PROCEDURE — 97530 THERAPEUTIC ACTIVITIES: CPT

## 2018-09-07 PROCEDURE — 85027 COMPLETE CBC AUTOMATED: CPT | Performed by: NURSE PRACTITIONER

## 2018-09-07 PROCEDURE — 97165 OT EVAL LOW COMPLEX 30 MIN: CPT

## 2018-09-07 PROCEDURE — 25010000002 ONDANSETRON PER 1 MG: Performed by: NURSE PRACTITIONER

## 2018-09-07 PROCEDURE — 0QB33ZZ EXCISION OF LEFT PELVIC BONE, PERCUTANEOUS APPROACH: ICD-10-PCS | Performed by: ORTHOPAEDIC SURGERY

## 2018-09-07 PROCEDURE — 25010000003 CEFAZOLIN IN DEXTROSE 2-4 GM/100ML-% SOLUTION: Performed by: ORTHOPAEDIC SURGERY

## 2018-09-07 PROCEDURE — 97110 THERAPEUTIC EXERCISES: CPT

## 2018-09-07 PROCEDURE — 97535 SELF CARE MNGMENT TRAINING: CPT

## 2018-09-07 PROCEDURE — 97116 GAIT TRAINING THERAPY: CPT

## 2018-09-07 PROCEDURE — 0ST20ZZ RESECTION OF LUMBAR VERTEBRAL DISC, OPEN APPROACH: ICD-10-PCS | Performed by: ORTHOPAEDIC SURGERY

## 2018-09-07 PROCEDURE — 80048 BASIC METABOLIC PNL TOTAL CA: CPT | Performed by: NURSE PRACTITIONER

## 2018-09-07 PROCEDURE — 0SG10AJ FUSION OF 2 OR MORE LUMBAR VERTEBRAL JOINTS WITH INTERBODY FUSION DEVICE, POSTERIOR APPROACH, ANTERIOR COLUMN, OPEN APPROACH: ICD-10-PCS | Performed by: ORTHOPAEDIC SURGERY

## 2018-09-07 RX ORDER — OXYCODONE AND ACETAMINOPHEN 10; 325 MG/1; MG/1
1 TABLET ORAL EVERY 4 HOURS PRN
Status: DISCONTINUED | OUTPATIENT
Start: 2018-09-07 | End: 2018-09-09 | Stop reason: HOSPADM

## 2018-09-07 RX ADMIN — NICOTINE 1 PATCH: 21 PATCH, EXTENDED RELEASE TRANSDERMAL at 08:07

## 2018-09-07 RX ADMIN — OXYCODONE HYDROCHLORIDE AND ACETAMINOPHEN 1 TABLET: 7.5; 325 TABLET ORAL at 00:40

## 2018-09-07 RX ADMIN — FAMOTIDINE 20 MG: 20 TABLET ORAL at 21:32

## 2018-09-07 RX ADMIN — BISACODYL 5 MG: 5 TABLET, COATED ORAL at 10:42

## 2018-09-07 RX ADMIN — FAMOTIDINE 20 MG: 10 INJECTION, SOLUTION INTRAVENOUS at 08:04

## 2018-09-07 RX ADMIN — OXYCODONE HYDROCHLORIDE AND ACETAMINOPHEN 1 TABLET: 10; 325 TABLET ORAL at 14:43

## 2018-09-07 RX ADMIN — OXYCODONE HYDROCHLORIDE AND ACETAMINOPHEN 1 TABLET: 7.5; 325 TABLET ORAL at 06:20

## 2018-09-07 RX ADMIN — INSULIN LISPRO 2 UNITS: 100 INJECTION, SOLUTION INTRAVENOUS; SUBCUTANEOUS at 12:55

## 2018-09-07 RX ADMIN — ONDANSETRON 4 MG: 2 INJECTION INTRAMUSCULAR; INTRAVENOUS at 08:04

## 2018-09-07 RX ADMIN — CEFAZOLIN SODIUM 2 G: 2 INJECTION, SOLUTION INTRAVENOUS at 00:35

## 2018-09-07 RX ADMIN — OXYCODONE HYDROCHLORIDE AND ACETAMINOPHEN 1 TABLET: 10; 325 TABLET ORAL at 10:42

## 2018-09-07 RX ADMIN — OXYCODONE HYDROCHLORIDE AND ACETAMINOPHEN 1 TABLET: 10; 325 TABLET ORAL at 21:32

## 2018-09-07 NOTE — PROGRESS NOTES
Ortho Spine Progress Note     LOS: 1 day   Patient Care Team:  Provider, No Known as PCP - General    Subjective: Back is sore.  Otherwise no unusual complaints.    Objective:   Vital Signs:  /65 (BP Location: Left arm, Patient Position: Sitting)   Pulse 90   Temp 98.4 °F (36.9 °C) (Temporal Artery )   Resp 18   SpO2 97%     Labs:  Lab Results (last 24 hours)     Procedure Component Value Units Date/Time    CBC (No Diff) [[id number]]  (Abnormal) Collected:  09/07/18 0742    Specimen:  Blood Updated:  09/07/18 0852     WBC 10.73 10*3/mm3      RBC 3.23 (L) 10*6/mm3      Hemoglobin 10.0 (L) g/dL      Hematocrit 30.3 (L) %      MCV 93.8 fL      MCH 31.0 pg      MCHC 33.0 g/dL      RDW 13.1 %      RDW-SD 45.5 fl      MPV 10.8 fL      Platelets 202 10*3/mm3     Narrative:       Verified by repeat analysis.  Verified by recollect    POC Glucose Once [[id number]]  (Normal) Collected:  09/07/18 0754    Specimen:  Blood Updated:  09/07/18 0755     Glucose 118 mg/dL     Narrative:       Meter: [id number] : [id number] Abigail Berg    Basic Metabolic Panel [[id number]]  (Abnormal) Collected:  09/07/18 0535    Specimen:  Blood Updated:  09/07/18 0731     Glucose 97 mg/dL      BUN 7 (L) mg/dL      Creatinine 0.73 mg/dL      Sodium 136 mmol/L      Potassium 4.5 mmol/L      Chloride 104 mmol/L      CO2 27.0 mmol/L      Calcium 8.4 (L) mg/dL      eGFR Non African Amer 82 mL/min/1.73      BUN/Creatinine Ratio 9.6     Anion Gap 5.0 mmol/L     Narrative:       National Kid[employer] Guidelines    Stage     Description        GFR  1         Normal or High     90+  2         Mild decrease      60-89  3         Moderate decrease  30-59  4         Severe decrease    15-29  5         Kidney failure     <15    The MDRD GFR formula is only valid for adults with stable renal function between ages 18 and 70.    POC Glucose Once [[id number]]  (Normal) Collected:  09/06/18 2053    Specimen:  Blood Updated:  09/06/18 2055     Glucose  129 mg/dL     Narrative:       Meter: [id number] : [id number] Sheba Calixto    POC Glucose Once [[id number]]  (Abnormal) Collected:  09/06/18 1748    Specimen:  Blood Updated:  09/06/18 1822     Glucose 291 (H) mg/dL     Narrative:       Meter: [id number] : [id number] Fahad Berg          Awake, alert, oriented.  Sitting up in chair.  Motor intact in lower extremities.    Assessment/Plan: Continue physical therapy/mobilization.  Remove Hemovac on Saturday.  Medical management per Dr. CONTRERAS/Keyla.    Rogelio Martinez MD  09/07/18  9:53 AM

## 2018-09-07 NOTE — PLAN OF CARE
Problem: Patient Care Overview  Goal: Plan of Care Review  Outcome: Ongoing (interventions implemented as appropriate)   09/07/18 2142   Plan of Care Review   Progress improving   OTHER   Outcome Summary pt ambulated with PT; voiding spontaneously; drain with minimal drainage; bp stabilized; pain managed with po meds.    Coping/Psychosocial   Plan of Care Reviewed With patient     Goal: Individualization and Mutuality  Outcome: Ongoing (interventions implemented as appropriate)    Goal: Discharge Needs Assessment  Outcome: Ongoing (interventions implemented as appropriate)    Goal: Interprofessional Rounds/Family Conf  Outcome: Ongoing (interventions implemented as appropriate)      Problem: Laminectomy/Foraminotomy/Discectomy (Adult)  Goal: Signs and Symptoms of Listed Potential Problems Will be Absent, Minimized or Managed (Laminectomy/Foraminotomy/Discectomy)  Outcome: Ongoing (interventions implemented as appropriate)    Goal: Anesthesia/Sedation Recovery  Outcome: Outcome(s) achieved Date Met: 09/07/18

## 2018-09-07 NOTE — PROGRESS NOTES
Discharge Planning Assessment  [facility]     Patient Name: Marlen Balderrama  MRN: [id number]  Today's Date: 9/7/2018    Admit Date: 9/6/2018          Discharge Needs Assessment     Row Name 09/07/18 1317       Living Environment    Lives With spouse    Name(s) of Who Lives With Patient :  Marco    Current Living Arrangements home/apartment/condo    Family Caregiver if Needed spouse    Quality of Family Relationships helpful;involved;supportive    Able to Return to Prior Arrangements yes    Living Arrangement Comments Ms. Balderrama lives with her  in a one story home, 2 steps to enter, in [facility].  She stated that her  is available to assist her at home as needed.       Resource/Environmental Concerns    Transportation Concerns car, none       Transition Planning    Patient/Family Anticipates Transition to home with family       Discharge Needs Assessment    Equipment Currently Used at Home none    Equipment Needed After Discharge none    Outpatient/Agency/Support Group Needs homecare agency    Discharge Facility/Level of Care Needs home with home health    Offered/Gave Vendor List yes    Patient's Choice of Community Agency(s) [facility]            Discharge Plan     Row Name 09/07/18 2463       Plan    Plan Home with 's assitance and [facility]    Patient/Family in Agreement with Plan yes    Plan Comments Met with Ms. Balderrama at the bedside for discharge planning.  Ms. Balderrama is ambulating with a rolling walker.  She denied DME needs, saying that she will not need a walker by the time she is discharged.  Discussed physical therapy/ staple removal referral and Ms. Balderrama requested a referral to [facility].  Contacted [facility] and spoke to Honey.  Faxed orders and clinical to the home health agency.  Requested that Ms. Balderrama call the home health agency when she is discharged and at home.  Ms. Balderrama stated that she will have assistance from her  , Marco, and he will be transporting her home at discharge.    CM will continue to follow.    Final Discharge Disposition Code 06 - home with home health care        Destination     No service coordination in this encounter.      Durable Medical Equipment     No service coordination in this encounter.      Dialysis/Infusion     No service coordination in this encounter.      Home Medical Care     No service coordination in this encounter.      Social Care     No service coordination in this encounter.                Demographic Summary     Row Name 09/07/18 1314       General Information    Admission Type inpatient    Arrived From home    Reason for Consult discharge planning    General Information Comments PCP:  Lesvia BARCLAY       Contact Information    Permission Granted to Share Info With     Contact Information Comments :  Marco,  [phone]            Functional Status     Row Name 09/07/18 1316       Functional Status    Usual Activity Tolerance good    Current Activity Tolerance --   See PT notes       Functional Status, IADL    Medications independent    Meal Preparation independent    Housekeeping independent    Laundry independent    Shopping independent       Mental Status    General Appearance WDL WDL            Psychosocial    No documentation.           Abuse/Neglect    No documentation.           Legal     Row Name 09/07/18 1316       Financial/Legal    Who Manages Finances if Patient Unable No advance directives    Finance Comments Ms. Balderrama has prescription drug coverage with [employer].  Verified insurance with patient.            Substance Abuse    No documentation.           Patient Forms    No documentation.         Antonella Sandra

## 2018-09-07 NOTE — THERAPY TREATMENT NOTE
Acute Care - Physical Therapy Treatment Note  [facility]     Patient Name: Marlen Balderrama  : 1960  MRN: [id number]  Today's Date: 2018  Onset of Illness/Injury or Date of Surgery: 18  Date of Referral to PT: 18  Referring Physician: MD Gm    Admit Date: 2018    Visit Dx:    ICD-10-CM ICD-9-CM   1. Impaired functional mobility, balance, gait, and endurance Z74.09 V49.89   2. Impaired mobility and ADLs Z74.09 799.89     Patient Active Problem List   Diagnosis   • Back pain   • S/P lumbar fusion   • Tobacco abuse   • Prediabetes       Therapy Treatment          Rehabilitation Treatment Summary     Row Name 18 1124             Treatment Time/Intention    Discipline physical therapist  -MJ      Document Type therapy note (daily note)  -MJ      Subjective Information complains of;pain  -MJ      Mode of Treatment physical therapy  -MJ      Patient/Family Observations Pt sitting UIC, hemovac, IV heplocked  -MJ      Patient Effort excellent  -MJ      Existing Precautions/Restrictions fall;spinal;other (see comments)   hemovac  -MJ      Recorded by [MJ] Isidro Olvera, PT 18 1156      Row Name 18 1124             Cognitive Assessment/Intervention- PT/OT    Orientation Status (Cognition) oriented x 4  -MJ      Follows Commands (Cognition) WFL  -MJ      Recorded by [MJ] Isidro Olvera, PT 18 1156      Row Name 18 1124             Bed Mobility Assessment/Treatment    Sit-Supine Sidney (Bed Mobility) supervision;verbal cues  -MJ      Comment (Bed Mobility) Pt performed log roll, verbal cues for sequencing  -MJ      Recorded by [MJ] Isidro Olvera, PT 18 1156      Row Name 18 1124             Transfer Assessment/Treatment    Transfer Assessment/Treatment sit-stand transfer;stand-sit transfer;toilet transfer  -MJ      Comment (Transfers) Verbal cues for correct hand placement. Assisted pt to bathroom, cues to reach back for grab bar prior to t/f. Pt  independent with hygiene  -MJ      Recorded by [MJ] Isidro Olvera, PT 09/07/18 1156      Row Name 09/07/18 1124             Sit-Stand Transfer    Sit-Stand [place] (Transfers) contact guard;verbal cues  -MJ      Assistive Device (Sit-Stand Transfers) walker, front-wheeled  -MJ      Recorded by [MJ] Isidro Olvera, PT 09/07/18 1156      Row Name 09/07/18 1124             Stand-Sit Transfer    Stand-Sit [place] (Transfers) contact guard;verbal cues  -MJ      Assistive Device (Stand-Sit Transfers) walker, front-wheeled  -MJ      Recorded by [MJ] Isidro Olvera, PT 09/07/18 1156      Row Name 09/07/18 1124             Toilet Transfer    Type (Toilet Transfer) sit-stand;stand-sit  -MJ      [place] Level (Toilet Transfer) contact guard;verbal cues  -MJ      Assistive Device (Toilet Transfer) grab bars/safety frame;raised toilet seat;walker, front-wheeled  -MJ      Recorded by [MJ] Isidro Olvera, PT 09/07/18 1156      Row Name 09/07/18 1124             Gait/Stairs Assessment/Training    60865 - Gait Training Minutes  12  -MJ      [place] Level (Gait) contact guard;verbal cues  -MJ      Assistive Device (Gait) walker, front-wheeled  -MJ      Distance in Feet (Gait) 250  -MJ      Pattern (Gait) step-through  -MJ      Deviations/Abnormal Patterns (Gait) bilateral deviations;shawn decreased;stride length decreased;base of support, narrow  -MJ      Bilateral Gait Deviations forward flexed posture;heel strike decreased  -MJ      Comment (Gait/Stairs) Pt demo step through gait pattern at slow pace. Verbal cues for upright posture and to stay in middle of RW. Pt initially exhibits narrow RULA, improved with cues to separate feet. Gait limited by fatigue  -MJ      Recorded by [MJ] Isidro Olvera, PT 09/07/18 1156      Row Name 09/07/18 1124             Motor Skills Assessment/Interventions    Additional Documentation Therapeutic Exercise (Group);Therapeutic Exercise Interventions (Group)  -MJ      Recorded by [MJ]  Isidro Olvera, PT 09/07/18 1156      Row Name 09/07/18 1124             Therapeutic Exercise    56208 - PT Therapeutic Exercise Minutes 7  -MJ      40943 - PT Therapeutic Activity Minutes 5  -MJ      Recorded by [MJ] Isidro Olvera, PT 09/07/18 1156      Row Name 09/07/18 1124             Therapeutic Exercise    Lower Extremity (Therapeutic Exercise) gluteal sets;quad sets, bilateral  -MJ      Lower Extremity Range of Motion (Therapeutic Exercise) ankle dorsiflexion/plantar flexion, bilateral;hip internal/external rotation, bilateral  -MJ      Core Strength (Therapeutic Exercise) abdominal bracing  -MJ      Exercise Type (Therapeutic Exercise) AROM (active range of motion);isometric contraction, static  -MJ      Position (Therapeutic Exercise) supine  -MJ      Sets/Reps (Therapeutic Exercise) 10x each  -MJ      Comment (Therapeutic Exercise) Back protocol: cues for technique  -MJ      Recorded by [MJ] Isidro Olvera, PT 09/07/18 1156      Row Name 09/07/18 1124             Positioning and Restraints    Pre-Treatment Position sitting in chair/recliner  -MJ      Post Treatment Position bed  -MJ      In Bed notified nsg;supine;call light within reach;encouraged to call for assist;SCD pump applied  -MJ      Recorded by [MJ] Isidro Olvera, PT 09/07/18 1156      Row Name 09/07/18 1124             Pain Scale: Numbers Pre/Post-Treatment    Pain Scale: Numbers, Pretreatment 10/10  -MJ      Pain Scale: Numbers, Post-Treatment 8/10  -MJ      Pain Location - Orientation lower  -MJ      Pain Location back  -MJ      Pain Intervention(s) Repositioned;Ambulation/increased activity  -MJ      Recorded by [MJ] Isidro Olvera, PT 09/07/18 1156      Row Name                Wound 09/06/18 1130 Other (See comments) back incision    Wound - Properties Group Date first assessed: 09/06/18 [TK] Time first assessed: 1130 [TK] Side: Other (See comments) [TK] Location: back [TK] Type: incision [TK] Recorded by:  [TK] Ani Ramey RN 09/06/18 1130     Row Name 09/07/18 1124             Plan of Care Review    Plan of Care Reviewed With patient  -MJ      Recorded by [RAQUEL] Isidro Olvera, PT 09/07/18 1156      Row Name 09/07/18 1124             Outcome Summary/Treatment Plan (PT)    Daily Summary of Progress (PT) progress toward functional goals is good  -MJ      Recorded by [RAQUEL] Isidro Olvera, PT 09/07/18 1156        User Key  (r) = Recorded By, (t) = Taken By, (c) = Cosigned By    Initials Name Effective Dates Discipline    TK Ani Ramey, RN 06/16/16 -  Nurse    Isidro Manning, PT 04/03/18 -  PT          Wound 09/06/18 1130 Other (See comments) back incision (Active)   Dressing Appearance dry;intact 9/7/2018  8:04 AM   Drainage Amount none 9/7/2018  8:04 AM   Dressing Care, Wound gauze 9/7/2018  8:04 AM             Physical Therapy Education     Title: PT OT SLP Therapies (Done)     Topic: Physical Therapy (Done)     Point: Mobility training (Done)    Learning Progress Summary     Learner Status Readiness Method Response Comment Documented by    Patient Done Acceptance E,D VU,NR Reviewed back precautions, log roll, HEP, gait mechanics, benefits of mobility  09/07/18 1156     Done Acceptance E,D,H VU,NR Issued and reviewed handout regarding spinal precautions. Educated on spinal precautions, correct log rolling technique, correct gait mechanics, benefits of frequent ambulation, and progression of POC. LR 09/06/18 1627          Point: Home exercise program (Done)    Learning Progress Summary     Learner Status Readiness Method Response Comment Documented by    Patient Done Acceptance SYD,D KRYSTAL,NR Reviewed back precautions, log roll, HEP, gait mechanics, benefits of mobility  09/07/18 1156     Done Acceptance E,D,H VU,NR Issued and reviewed handout regarding spinal precautions. Educated on spinal precautions, correct log rolling technique, correct gait mechanics, benefits of frequent ambulation, and progression of POC.  09/06/18 1627          Point: Body mechanics  (Done)    Learning Progress Summary     Learner Status Readiness Method Response Comment Documented by    Patient Done Acceptance TOBI VELARDE NR Reviewed back precautions, log roll, HEP, gait mechanics, benefits of mobility  09/07/18 1156     Done Acceptance TOBI VELARDE,H BASILIA RICE Issued and reviewed handout regarding spinal precautions. Educated on spinal precautions, correct log rolling technique, correct gait mechanics, benefits of frequent ambulation, and progression of POC.  09/06/18 1627          Point: Precautions (Done)    Learning Progress Summary     Learner Status Readiness Method Response Comment Documented by    Patient Done Acceptance TOBI VELARDE,BASILIA Reviewed back precautions, log roll, HEP, gait mechanics, benefits of mobility  09/07/18 1156     Done Acceptance TOBI VELARDE,BASILIA MAHAN Issued and reviewed handout regarding spinal precautions. Educated on spinal precautions, correct log rolling technique, correct gait mechanics, benefits of frequent ambulation, and progression of POC.  09/06/18 1627                      User Key     Initials Effective Dates Name Provider Type Discipline     06/19/15 -  Karin Correia, PT Physical Therapist PT     04/03/18 -  Isidro Olvera, PT Physical Therapist PT                    PT Recommendation and Plan     Outcome Summary/Treatment Plan (PT)  Daily Summary of Progress (PT): progress toward functional goals is good  Plan of Care Reviewed With: patient  Progress: improving  Outcome Summary: Pt ambulated 250 feet with CGA and RW, limited by fatigue. Pt progressed to ther ex this date. Will continue to progress mobility as able.           Outcome Measures     Row Name 09/07/18 1124 09/07/18 0814 09/06/18 1535       How much help from another person do you currently need...    Turning from your back to your side while in flat bed without using bedrails? 3  -MJ  -- 3  -LR    Moving from lying on back to sitting on the side of a flat bed without bedrails? 3  -MJ  -- 3  -LR    Moving  to and from a bed to a chair (including a wheelchair)? 3  -MJ  -- 3  -LR    Standing up from a chair using your arms (e.g., wheelchair, bedside chair)? 3  -MJ  -- 3  -LR    Climbing 3-5 steps with a railing? 3  -MJ  -- 3  -LR    To walk in hospital room? 3  -MJ  -- 3  -LR    AM-PAC 6 Clicks Score 18  -MJ  -- 18  -LR       How much help from another is currently needed...    Putting on and taking off regular lower body clothing?  -- 3  -EDGARDO  --    Bathing (including washing, rinsing, and drying)  -- 3  -EDGARDO  --    Toileting (which includes using toilet bed pan or urinal)  -- 3  -EDGARDO  --    Putting on and taking off regular upper body clothing  -- 3  -EDGARDO  --    Taking care of personal grooming (such as brushing teeth)  -- 4  -EDGARDO  --    Eating meals  -- 4  -EDGARDO  --    Score  -- 20  -EDGARDO  --       Functional Assessment    Outcome Measure Options AM-PAC 6 Clicks Basic Mobility (PT)  -MJ AM-PAC 6 Clicks Daily Activity (OT)  -EDGARDO AM-PAC 6 Clicks Basic Mobility (PT)  -LR      User Key  (r) = Recorded By, (t) = Taken By, (c) = Cosigned By    Initials Name Provider Type    Indira Jonas, OT Occupational Therapist    LR Karin Correia, PT Physical Therapist    Isidro Manning, PT Physical Therapist           Time Calculation:         PT Charges     Row Name 09/07/18 1124             Time Calculation    Start Time 1124  -MJ      PT Received On 09/07/18  -MJ      PT Goal Re-Cert Due Date 09/16/18  -MJ         Time Calculation- PT    Total Timed Code Minutes- PT 24 minute(s)  -MJ         Timed Charges    28996 - PT Therapeutic Exercise Minutes 7  -MJ      02903 - Gait Training Minutes  12  -MJ      55317 - PT Therapeutic Activity Minutes 5  -MJ        User Key  (r) = Recorded By, (t) = Taken By, (c) = Cosigned By    Initials Name Provider Type    Isidro Manning, PT Physical Therapist        Therapy Suggested Charges     Code   Minutes Charges    58079 (CPT®) Hc Pt Neuromusc Re Education Ea 15 Min      46042 (CPT®) Hc Pt  Ther Proc Ea 15 Min 7 1    33159 (CPT®) Hc Gait Training Ea 15 Min 12 1    26829 (CPT®) Hc Pt Therapeutic Act Ea 15 Min 5     31606 (CPT®) Hc Pt Manual Therapy Ea 15 Min      64831 (CPT®) Hc Pt Iontophoresis Ea 15 Min      70356 (CPT®) Hc Pt Elec Stim Ea-Per 15 Min      75592 (CPT®) Hc Pt Ultrasound Ea 15 Min      32703 (CPT®) Hc Pt Self Care/Mgmt/Train Ea 15 Min      64521 (CPT®) Hc Pt Prosthetic (S) Train Initial Encounter, Each 15 Min      62683 (CPT®) Hc Pt Orthotic(S)/Prosthetic(S) Encounter, Each 15 Min      47658 (CPT®) Hc Orthotic(S) Mgmt/Train Initial Encounter, Each 15min      Total  24 2        Therapy Charges for Today     Code Description Service Date Service Provider Modifiers Qty    86857926837 HC PT THER PROC EA 15 MIN 9/7/2018 Isidro Olvera, PT GP 1    92998235683 HC GAIT TRAINING EA 15 MIN 9/7/2018 Isidro Olvera, PT GP 1          PT G-Codes  Outcome Measure Options: AM-PAC 6 Clicks Basic Mobility (PT)  AM-PAC 6 Clicks Score: 18  Score: 20    Isidro Olvera PT  9/7/2018

## 2018-09-07 NOTE — PLAN OF CARE
Problem: Patient Care Overview  Goal: Plan of Care Review  Outcome: Ongoing (interventions implemented as appropriate)   09/07/18 0920   Plan of Care Review   Progress improving   OTHER   Outcome Summary OT evaluation completed. Pt. demonstrating impaired knowledge and decreased ADL and transfer independence s/p spinal sx with spinal prec. Pt. educated on log roll, AE/adapted technique for dressing/bathing, spinal precuations, log roll and position/car transfers. Pt. supervision OOB and CGA to stand mobilize and into chair. Pt. able to dress self with adapated technique. Pt. appropriate for skilled OT services to continue to promote return to PLOF and education. Plans home with spouse assist.   Coping/Psychosocial   Plan of Care Reviewed With patient

## 2018-09-07 NOTE — PLAN OF CARE
Problem: Patient Care Overview  Goal: Plan of Care Review  Outcome: Ongoing (interventions implemented as appropriate)      Problem: Laminectomy/Foraminotomy/Discectomy (Adult)  Goal: Signs and Symptoms of Listed Potential Problems Will be Absent, Minimized or Managed (Laminectomy/Foraminotomy/Discectomy)  Outcome: Ongoing (interventions implemented as appropriate)

## 2018-09-07 NOTE — THERAPY EVALUATION
Acute Care - Occupational Therapy Initial Evaluation  [facility]     Patient Name: Marlen Balderrama  : 1960  MRN: [id number]  Today's Date: 2018  Onset of Illness/Injury or Date of Surgery: 18  Date of Referral to OT: 18  Referring Physician: MD Gm    Admit Date: 2018       ICD-10-CM ICD-9-CM   1. Impaired functional mobility, balance, gait, and endurance Z74.09 V49.89   2. Impaired mobility and ADLs Z74.09 799.89     Patient Active Problem List   Diagnosis   • Back pain   • S/P lumbar fusion   • Tobacco abuse   • Prediabetes     Past Medical History:   Diagnosis Date   • Back pain    • Spinal stenosis      Past Surgical History:   Procedure Laterality Date   • LUMBAR DISCECTOMY FUSION INSTRUMENTATION N/A 2018    Procedure: LUMBAR LAMINECTOMY POSTERIOR WITH FUSION INSTRUMENTATION L3-4 AND L4-5;  Surgeon: Rogelio Martinez MD;  Location: [facility];  Service: Orthopedic Spine   • NO PAST SURGERIES            OT ASSESSMENT FLOWSHEET (last 72 hours)      Occupational Therapy Evaluation     Row Name 18 0814                   OT Evaluation Time/Intention    Subjective Information complains of;pain;nausea/vomiting  -EDGARDO        Document Type evaluation;therapy note (daily note)  -EDGARDO        Mode of Treatment individual therapy;occupational therapy  -EDGARDO        Patient Effort excellent  -EDGARDO        Symptoms Noted During/After Treatment none  -EDGARDO        Comment premedicated  -EDGARDO           General Information    Patient Profile Reviewed? yes  -EDGARDO        Onset of Illness/Injury or Date of Surgery 18  -EDGARDO        Referring Physician MD Gm  -EDGARDO        Patient Observations alert;cooperative;agree to therapy  -EDGARDO        Patient/Family Observations No family present.  -EDGARDO        General Observations of Patient Pt. supine in bed with hemovac.  -EDGARDO        Prior Level of Function min assist:;all household mobility;community mobility;ADL's;home management;shopping;independent:;driving    Pt. could only shop for a few items at a time, clean short t  -EDGARDO        Equipment Currently Used at Home none  -EDGARDO        Pertinent History of Current Functional Problem Pt. admitted for surgical management of persistent adn progressive lower back pain and RLE pain that failed to improve with conservative measures.  Pt. found with lumbar spinal stenosis L3-4, L4-5 segmental instability L3-4, L4-5 with lateral liisthesis, degenerative scoliosis L3-45, severe spondylolisthsis/disc degeneration L3-4, L4-5, s/p decompressive laminactomy, medial facetectomy adn foraminotomies to decompress neuural elements at L3-4, L4-5 , anterior body fusion L3-4, segmental instrumentaion L3-4-5 with transpedular fixation posterior/lateral fusions L3-4, L4-5.  -EDGARDO        Existing Precautions/Restrictions fall;spinal  -EDGARDO        Equipment Issued to Patient --   reacher,  bath sponge  -EDGARDO        Risks Reviewed patient:;LOB;nausea/vomiting;dizziness;lines disloged  -EDGARDO        Benefits Reviewed patient:;improve function;increase independence;increase balance;increase strength;increase knowledge  -EDGARDO        Barriers to Rehab previous functional deficit;medically complex  -EDGARDO           Relationship/Environment    Primary Source of Support/Comfort spouse  -EDGARDO        Lives With spouse  -EDGARDO        Name(s) of Who Lives With Patient aMrco Balderrama  -EDGARDO           Resource/Environmental Concerns    Current Living Arrangements home/apartment/condo  -EDGARDO           Cognitive Assessment/Interventions    Additional Documentation Cognitive Assessment/Intervention (Group)  -EDGARDO           Cognitive Assessment/Intervention- PT/OT    Orientation Status (Cognition) oriented x 4  -EDGARDO        Follows Commands (Cognition) WFL  -EDGARDO        Personal Safety Interventions fall prevention program maintained;gait belt;nonskid shoes/slippers when out of bed  -EDGARDO           Bed Mobility Assessment/Treatment    Bed Mobility Assessment/Treatment rolling left;sidelying-sit   -EDGARDO        Rolling Left [place] (Bed Mobility) supervision;verbal cues  -EDGARDO        Sidelying-Sit [place] (Bed Mobility) supervision;verbal cues  -EDGARDO        Bed Mobility, Safety Issues impaired trunk control for bed mobility  -EDGARDO        Assistive Device (Bed Mobility) bed rails   bed flat  -EDGARDO        Comment (Bed Mobility) pt. post education on log roll demonstrated with bed rail  -EDGARDO           Functional Mobility    Functional Mobility- Ind. Level contact guard assist  -EDGARDO        Functional Mobility- Device rolling walker  -EDGARDO        Functional Mobility-Distance (Feet) 50  -EDGARDO        Functional Mobility- Safety Issues --   cues for upright posture  -EDGARDO           Transfer Assessment/Treatment    Transfer Assessment/Treatment sit-stand transfer;stand-sit transfer  -EDGARDO        Comment (Transfers) verbal cues not to pull up on walker and to reach back for arm rest and lower slowly.  -EDGARDO           Sit-Stand Transfer    Sit-Stand [place] (Transfers) verbal cues;contact guard  -EDGARDO        Assistive Device (Sit-Stand Transfers) walker, front-wheeled  -EDGARDO           Stand-Sit Transfer    Stand-Sit [place] (Transfers) verbal cues;contact guard  -EDGARDO        Assistive Device (Stand-Sit Transfers) walker, front-wheeled  -EDGARDO           ADL Assessment/Intervention    86632 - OT Self Care/Mgmt Minutes 15  -EDGARDO        BADL Assessment/Intervention lower body dressing;bathing;toileting  -EDGARDO           Bathing Assessment/Intervention    Comment (Bathing) OT simulated for pt. in standing how to use LH bath sponge to clean LE's.  -EDGARDO           Lower Body Dressing Assessment/Training    Lower Body Dressing [place] Level doff;don;pants/bottoms;shoes/slippers;socks;supervision;verbal cues;conditional independence  -EDGARDO        Assistive Devices (Lower Body Dressing) long-handled shoe horn;reacher;sock-aid  -        Lower Body Dressing Position edge of bed sitting  -EDGARDO        Comment (Lower Body Dressing) Pt. at first  could not cross up LE's to dress, but after educating pt. on AE use and pt. demonstrating back using AE, pt. then showed she could cross up LE and keep back straight.  Showed pt. one handed technique to carlito and doff shoes and socks so would not twist trunk.  -EDGARDO           Toileting Assessment/Training    Comment (Toileting) Pt. able to simulate reaching back to wipe buttocks and keeping spinal precuations.  -EDGARDO           General ROM    GENERAL ROM COMMENTS WFL AROM BUe  -EDGARDO           MMT (Manual Muscle Testing)    General MMT Comments BUE at least 4/5.  Not formally assessed due to spinal prec.  -EDGARDO           Motor Assessment/Interventions    Additional Documentation Balance (Group);Balance Interventions (Group);Therapeutic Exercise (Group);Therapeutic Exercise Interventions (Group)  -EDGARDO           Therapeutic Exercise    22600 - OT Therapeutic Exercise Minutes 2  -EDGARDO        96286 - OT Therapeutic Activity Minutes 10  -EDGARDO           Therapeutic Exercise    Upper Extremity (Therapeutic Exercise) --   neck retraction  -EDGARDO        Upper Extremity Range of Motion (Therapeutic Exercise) shoulder abduction/adduction, bilateral   scapula elevation, retraction, circles.  -EDGARDO           Balance    Balance dynamic sitting balance;static standing balance  -EDGARDO           Dynamic Sitting Balance    Level of Rankin, Reaches Outside Midline (Sitting, Dynamic Balance) independent  -EDGARDO           Static Standing Balance    Level of Rankin (Supported Standing, Static Balance) conditional independence  -EDGARDO           Sensory Assessment/Intervention    Sensory General Assessment no sensation deficits identified   per pt. report  -EDGARDO           Vision Assessment/Intervention    Visual Impairment/Limitations corrective lenses for distance  -EDGARDO           Positioning and Restraints    Pre-Treatment Position in bed  -EDGARDO        Post Treatment Position chair  -EDGARDO        In Chair call light within reach;encouraged to call for assist;exit  alarm on;sitting  -EDGARDO           Pain Scale: Numbers Pre/Post-Treatment    Pain Scale: Numbers, Pretreatment 10/10   pt., appears to overrate, facially 5/10  -EDGARDO        Pain Scale: Numbers, Post-Treatment 10/10  -EDGARDO        Pain Location - Orientation lower  -EDGARDO        Pain Location back  -EDGARDO        Pain Intervention(s) Repositioned   premedication  -EDGARDO           Wound 09/06/18 1130 Other (See comments) back incision    Wound - Properties Group Date first assessed: 09/06/18  -TK Time first assessed: 1130  -TK Side: Other (See comments)  -TK Location: back  -TK Type: incision  -TK       Plan of Care Review    Plan of Care Reviewed With patient  -EDGARDO           Clinical Impression (OT)    Date of Referral to OT 09/06/18  -EDGARDO        OT Diagnosis Impaired ADL and transfer independence due to spinal sx with spinal prec.  -EDGARDO        Patient/Family Goals Statement (OT Eval) go home with spouse assist, but be as indep as possible due to pt's  needs transplant  -EDGARDO        Criteria for Skilled Therapeutic Interventions Met (OT Eval) yes;treatment indicated  -EDGARDO        Rehab Potential (OT Eval) good, to achieve stated therapy goals  -EDGARDO        Therapy Frequency (OT Eval) daily  -EDGARDO        Care Plan Review (OT) evaluation/treatment results reviewed;care plan/treatment goals reviewed;risks/benefits reviewed;current/potential barriers reviewed;patient/other agree to care plan  -EDGARDO        Anticipated Equipment Needs at Discharge (OT) --   may need RTS/BSC, pt. unsure toilet height  -EDGARDO        Anticipated Discharge Disposition (OT) home with assist  -EDGARDO           Planned OT Interventions    Planned Therapy Interventions (OT Eval) adaptive equipment training;BADL retraining;occupation/activity based interventions;patient/caregiver education/training  -EDGARDO           OT Goals    Bed Mobility Goal Selection (OT) bed mobility, OT goal 1  -EDGARDO        Transfer Goal Selection (OT) transfer, OT goal 1  -EDGARDO        Dressing Goal Selection  (OT) dressing, OT goal 1  -EDGARDO        Toileting Goal Selection (OT) toileting, OT goal 1  -EDGARDO           Bed Mobility Goal 1 (OT)    Activity/Assistive Device (Bed Mobility Goal 1, OT) bed mobility activities, all   log roll in and out of bed without AD  -EDGARDO        [place] Level/Cues Needed (Bed Mobility Goal 1, OT) independent  -EDGARDO        Time Frame (Bed Mobility Goal 1, OT) long term goal (LTG);3 days  -EDGARDO        Progress/Outcomes (Bed Mobility Goal 1, OT) goal ongoing  -EDGARDO           Transfer Goal 1 (OT)    Activity/Assistive Device (Transfer Goal 1, OT) sit-to-stand/stand-to-sit;toilet;tub;walker, rolling  -EDGARDO        [place] Level/Cues Needed (Transfer Goal 1, OT) supervision required;verbal cues required  -EDGARDO        Time Frame (Transfer Goal 1, OT) long term goal (LTG);3 days  -EDGARDO        Progress/Outcome (Transfer Goal 1, OT) goal ongoing  -EDGARDO           Dressing Goal 1 (OT)    Activity/Assistive Device (Dressing Goal 1, OT) lower body dressing   AE/adapted technique  -EDGARDO        [place]/Cues Needed (Dressing Goal 1, OT) supervision required;set-up required;verbal cues required  -EDGARDO        Time Frame (Dressing Goal 1, OT) long term goal (LTG);3 days  -EDGARDO        Progress/Outcome (Dressing Goal 1, OT) goal met  -EDGARDO           Toileting Goal 1 (OT)    Activity/Device (Toileting Goal 1, OT) perform perineal hygiene   simulate  -EDGARDO        [place] Level/Cues Needed (Toileting Goal 1, OT) independent  -EDGARDO        Time Frame (Toileting Goal 1, OT) long term goal (LTG);3 days  -EDGARDO        Progress/Outcome (Toileting Goal 1, OT) goal met  -EDGARDO           Patient Education Goal (OT)    Activity (Patient Education Goal, OT) Pt. verbalize and demonstrate use of spinal precuations with ADL task and trasnfers  -EDGARDO        [place]/Cues/Accuracy (Memory Goal 2, OT) verbalizes understanding;demonstrates adequately  -EDGARDO        Time Frame (Patient Education Goal, OT) long term goal (LTG);3 days  -EDGARDO         Progress/Outcome (Patient Education Goal, OT) goal ongoing  -EDGARDO           Living Environment    Home Accessibility not wheelchair accessible;tub/shower is not walk in  -EDGARDO          User Key  (r) = Recorded By, (t) = Taken By, (c) = Cosigned By    Initials Name Effective Dates    EDGARDO Indira Jade Mela, OT 06/08/18 -     TK Ani Ramey RN 06/16/16 -            Occupational Therapy Education     Title: PT OT SLP Therapies (Done)     Topic: Occupational Therapy (Done)     Point: ADL training (Done)     Description: Instruct learner(s) on proper safety adaptation and remediation techniques during self care or transfers.   Instruct in proper use of assistive devices.   Learning Progress Summary     Learner Status Readiness Method Response Comment Documented by    Patient Done Acceptance E,D,H VU,NR role OT, reason for consult, AE use, adapted technique to maintain spinal prec., log roll, spinal prec., AD available, car transfers, posture.  09/07/18 0919          Point: Home exercise program (Done)     Description: Instruct learner(s) on appropriate technique for monitoring, assisting and/or progressing therapeutic exercises/activities.   Learning Progress Summary     Learner Status Readiness Method Response Comment Documented by    Patient Done Acceptance E,D,H VU,NR role OT, reason for consult, AE use, adapted technique to maintain spinal prec., log roll, spinal prec., AD available, car transfers, posture.  09/07/18 0919          Point: Precautions (Done)     Description: Instruct learner(s) on prescribed precautions during self-care and functional transfers.   Learning Progress Summary     Learner Status Readiness Method Response Comment Documented by    Patient Done Acceptance E,D,H VU,NR role OT, reason for consult, AE use, adapted technique to maintain spinal prec., log roll, spinal prec., AD available, car transfers, posture.  09/07/18 0919          Point: Body mechanics (Done)     Description: Instruct  learner(s) on proper positioning and spine alignment during self-care, functional mobility activities and/or exercises.   Learning Progress Summary     Learner Status Readiness Method Response Comment Documented by    Patient Done Acceptance E,D,H KRYSTAL,BASILIA role OT, reason for consult, AE use, adapted technique to maintain spinal prec., log roll, spinal prec., AD available, car transfers, posture.  09/07/18 0919                      User Key     Initials Effective Dates Name Provider Type Discipline     06/08/18 -  Indira Jade, OT Occupational Therapist OT                  OT Recommendation and Plan  Outcome Summary/Treatment Plan (OT)  Anticipated Equipment Needs at Discharge (OT):  (may need RTS/BSC, pt. unsure toilet height)  Anticipated Discharge Disposition (OT): home with assist  Planned Therapy Interventions (OT Eval): adaptive equipment training, BADL retraining, occupation/activity based interventions, patient/caregiver education/training  Therapy Frequency (OT Eval): daily  Plan of Care Review  Plan of Care Reviewed With: patient  Plan of Care Reviewed With: patient  Outcome Summary: OT evaluation completed.  Pt. demonstrating impaired knowledge and decreased ADL and transfer independence s/p spinal sx with spinal prec.  Pt. educated on log roll, AE/adapted technique for dressing/bathing, spinal precuations, log roll and position/car transfers.  Pt. supervision OOB and CGA to stand mobilize and into chair.  Pt. able to dress self with adapated technique.  Pt. appropriate for skilled OT services to continue to promote return to PLOF and education.  Plans home with spouse assist.          Outcome Measures     Row Name 09/07/18 0814 09/06/18 1535          How much help from another person do you currently need...    Turning from your back to your side while in flat bed without using bedrails?  -- 3  -LR     Moving from lying on back to sitting on the side of a flat bed without bedrails?  -- 3  -LR      Moving to and from a bed to a chair (including a wheelchair)?  -- 3  -LR     Standing up from a chair using your arms (e.g., wheelchair, bedside chair)?  -- 3  -LR     Climbing 3-5 steps with a railing?  -- 3  -LR     To walk in hospital room?  -- 3  -LR     AM-PAC 6 Clicks Score  -- 18  -LR        How much help from another is currently needed...    Putting on and taking off regular lower body clothing? 3  -EDGARDO  --     Bathing (including washing, rinsing, and drying) 3  -EDGARDO  --     Toileting (which includes using toilet bed pan or urinal) 3  -EDGARDO  --     Putting on and taking off regular upper body clothing 3  -EDGARDO  --     Taking care of personal grooming (such as brushing teeth) 4  -EDGARDO  --     Eating meals 4  -EDGARDO  --     Score 20  -EDGARDO  --        Functional Assessment    Outcome Measure Options AM-PAC 6 Clicks Daily Activity (OT)  -EDGARDO AM-[facility] 6 Clicks Basic Mobility (PT)  -LR       User Key  (r) = Recorded By, (t) = Taken By, (c) = Cosigned By    Initials Name Provider Type    Indira Jonas, OT Occupational Therapist    LR Karin Correia, PT Physical Therapist          Time Calculation:         Time Calculation- OT     Row Name 09/07/18 0923 09/07/18 0814          Time Calculation- OT    OT Start Time 0814  -EDGARDO  --     OT Received On 09/07/18  -EDGARDO  --     OT Goal Re-Cert Due Date 09/17/18  -EDGARDO  --        Timed Charges    92825 - OT Therapeutic Exercise Minutes  -- 2  -EDGARDO     76565 - OT Therapeutic Activity Minutes  -- 10  -EDGARDO     73104 - OT Self Care/Mgmt Minutes  -- 15  -EDGARDO       User Key  (r) = Recorded By, (t) = Taken By, (c) = Cosigned By    Initials Name Provider Type    Indira Jonas, OT Occupational Therapist        Therapy Suggested Charges     Code   Minutes Charges    98323 (CPT®) Hc Ot Neuromusc Re Education Ea 15 Min      85739 (CPT®) Hc Ot Ther Proc Ea 15 Min 2     39637 (CPT®) Hc Ot Therapeutic Act Ea 15 Min 10 1    96337 (CPT®) Hc Ot Manual Therapy Ea 15 Min      28849 (CPT®) Hc Ot  Iontophoresis Ea 15 Min      46779 (CPT®) Hc Ot Elec Stim Ea-Per 15 Min      19732 (CPT®) Hc Ot Ultrasound Ea 15 Min      60596 (CPT®) Hc Ot Self Care/Mgmt/Train Ea 15 Min 15 1    Total  27 2        Therapy Charges for Today     Code Description Service Date Service Provider Modifiers Qty    83548398073 HC OT THERAPEUTIC ACT EA 15 MIN 9/7/2018 Indira Jade, OT GO 1    76683153536 HC OT SELF CARE/MGMT/TRAIN EA 15 MIN 9/7/2018 Indira Jade, OT GO 1    20692202277 HC OT EVAL LOW COMPLEXITY 4 9/7/2018 Indira Jade, OT GO 1               Indira Jade OT  9/7/2018

## 2018-09-07 NOTE — PROGRESS NOTES
IM progress note      Marlen Balderrama  [id number]  1960     LOS: 1 day     Attending: Rogelio Martinez MD    Primary Care Provider: Provider, No Known      Chief Complaint/Reason for visit:  Low back pain     Subjective   Doing well. Having moderate low back pain, denies leg pain. Denies f/c/n/v/sob/cp.  ( improving , no new problems. Voiding ok. Walked 250 ft with PT . )wy    Objective     Vital Signs  Blood pressure 100/64, pulse 97, temperature 98 °F (36.7 °C), temperature source Temporal Artery , resp. rate 18, SpO2 98 %, not currently breastfeeding.  Temp (24hrs), Av.2 °F (36.8 °C), Min:97.4 °F (36.3 °C), Max:98.5 °F (36.9 °C)      Nutrition: PO    Respiratory: RA    Physical Therapy: Pt ambulated 250 feet with CGA and RW, limited by fatigue. Pt progressed to ther ex this date. Will continue to progress mobility as able.     Physical Exam:     General Appearance:    Alert, cooperative, in no acute distress   Head:    Normocephalic, without obvious abnormality, atraumatic    Lungs:     Normal effort, symmetric chest rise, no crepitus, clear to      auscultation bilaterally             Heart:    Regular rhythm and normal rate, normal S1 and S2   Abdomen:     Normal bowel sounds, no masses, no organomegaly, soft        non-tender, non-distended, no guarding, no rebound                tenderness   Extremities:   No clubbing, cyanosis or edema.  No deformities.    Pulses:   Pulses palpable and equal bilaterally   Skin:   No bleeding, bruising or rash. Back dressing CDI. HV present   Neurologic:   Moves all extremities with no obvious focal motor deficit.  Cranial nerves 2 - 12 grossly intact. Flexion and dorsiflexion intact bilateral feet.       Results Review:     I reviewed the patient's new clinical results.     Results from last 7 days  Lab Units 18  0742 18  1331   WBC 10*3/mm3 10.73 7.91   HEMOGLOBIN g/dL 10.0* 14.6   HEMATOCRIT % 30.3* 44.6*   PLATELETS 10*3/mm3 202 300       Results  from last 7 days  Lab Units 09/07/18  0535 09/05/18  1331   SODIUM mmol/L 136  --    POTASSIUM mmol/L 4.5  --    CHLORIDE mmol/L 104  --    CO2 mmol/L 27.0  --    BUN mg/dL 7*  --    CREATININE mg/dL 0.73  --    CALCIUM mg/dL 8.4*  --    GLUCOSE mg/dL 97 108*     Results for DAVID VILLALPANDO (MRN [id number]) as of 9/7/2018 12:46   Ref. Range 9/6/2018 20:53 9/7/2018 05:35 9/7/2018 07:42 9/7/2018 07:54 9/7/2018 11:58   Glucose Latest Ref Range: 70 - 130 mg/dL 129 97  118 153 (H)     I reviewed the patient's new imaging including images and reports.    All medications reviewed.     famotidine 20 mg Intravenous Q12H   Or      famotidine 20 mg Oral Q12H   insulin lispro 0-7 Units Subcutaneous 4x Daily With Meals & Nightly   nicotine 1 patch Transdermal Q24H       Assessment/Plan   Principal Problem:    S/P lumbar fusion  Active Problems:    Back pain    Tobacco abuse    Prediabetes    Acute blood loss anemia, mild, asymptomatic    Acute postoperative pain      Plan  1. PT- ambulate  2. Pain control-prns   3. IS-encouraged  4. DVT proph- mechs  5. Bowel regimen  6. Monitor post-op labs  7. DC planning for home, likely Sunday     Remove HV Saturday per Dr. Martinez    Tobacco abuse  - Nicotine patch     PreDM  - hgb A1c on 9/5/18 5.9  - Accuchecks ACHS with low dose SSI    I have personally performed the evaluation on this patient. My history is consistent  with HPI obtained. My exam findings are listed above. I have personally reviewed and discussed the above formulated treatment plan with pt and AH. DENY.DENY Saavedra  09/07/18  12:48 PM

## 2018-09-07 NOTE — PLAN OF CARE
Problem: Patient Care Overview  Goal: Plan of Care Review  Outcome: Ongoing (interventions implemented as appropriate)   09/07/18 6447   Plan of Care Review   Progress improving   OTHER   Outcome Summary Pt ambulated 250 feet with CGA and RW, limited by fatigue. Pt progressed to ther ex this date. Will continue to progress mobility as able.    Coping/Psychosocial   Plan of Care Reviewed With patient       Problem: Laminectomy/Foraminotomy/Discectomy (Adult)  Goal: Signs and Symptoms of Listed Potential Problems Will be Absent, Minimized or Managed (Laminectomy/Foraminotomy/Discectomy)  Outcome: Ongoing (interventions implemented as appropriate)   09/07/18 4087   Goal/Outcome Evaluation   Problems Assessed (Laminectomy/Laminotomy/Discectomy) functional decline/self-care deficit;pain   Problems Present (Laminectomy/otomy) functional decline/self-care deficit;pain

## 2018-09-07 NOTE — DISCHARGE PLACEMENT REQUEST
"David Villalpando (58 y.o. Female)   From:  Antonella HUNTN RN, , ph [phone]  Home Health Orders    Date of Birth Social Security Number Address Home Phone MRN    1960  [address]  [place] [address] [phone] [id number]    Luther[employer] Marital Status          Zoroastrianism        Admission Date Admission Type Admitting Provider Attending Provider Department, Room/Bed    18 Elective Rogelio Martinez MD Vaughan, John J, MD [facility] 3G, S361/1    Discharge Date Discharge Disposition Discharge Destination                       Attending Provider:  Rogelio Martinez MD    Allergies:  Medrol [Methylprednisolone]    Isolation:  None   Infection:  None   Code Status:  CPR    Ht:  167.6 cm (66\")   Wt:  52.7 kg (116 lb 2.9 oz)    Admission Cmt:  None   Principal Problem:  S/P lumbar fusion [Z98.1]                 Active Insurance as of 2018     Primary Coverage     Payor Plan Insurance Group Employer/Plan Group    ANTHEM BLUE CROSS ANTHEM BLUE CROSS BLUE [id number]     Payor Plan Address Payor Plan Phone Number Effective From Effective To    [address] [phone] 2018     [facility] [id number]       Subscriber Name Subscriber Birth Date Member ID       DAVID VILLALPANDO 1960 [id number]                 Emergency Contacts      (Rel.) Home Phone Work Phone Mobile Phone    Marco Kowalski (Spouse) -- -- [phone]        [address] [address]  Phone:  [phone]  Fax:          Patient:     David Villalpando MRN:  [id number]   [address]  [place] [address] :  1960  SSN:    Phone: [phone] Sex:  F      INSURANCE PAYOR PLAN GROUP # SUBSCRIBER ID   Primary:    VITALY BAUER CROSS [id number]   Admitting Diagnosis: Back pain [M54.9]  Order Date:  Sep 6, 2018         Consult to Case Management        (Order ID: [id number])   "   Diagnosis:         Priority:  Routine Expected Date:   Expiration Date:        Interval:   Count:    Reason for Consult? needs home pt and supplies. needs staples out at home 2 weeks post op.     Specimen Type:   Specimen Source:   Specimen Taken Date:   Specimen Taken Time:                   Authorizing Provider:Rogelio Martinez MD  Authorizing Provider's NPI: [id number]  Order Entered By: Rogelio Martinez MD 2018 11:35 AM     Electronically signed by: Rogelio Martinez MD 2018 11:35 AM            History & Physical      Rashid Kohli MD at 2018 12:50 PM          Patient Name: Marlen Balderrama  MRN: [id number]  : 1960  DOS: 2018    Attending: Rogelio Martinez MD    Primary Care Provider: Provider, No Known      Chief complaint:  Low back pain     Subjective   Patient is a 58 y.o. female presented for lumbar fusion by Dr. Martinez under GA. She tolerated surgery well and is admitted for further medical management. Her back has been painful for several months with pain, numbness and tingling down the right leg. She denies saddle anesthesia.     PROCEDURES PERFORMED:   1. Decompressive laminectomy, medial facetectomy and foraminotomies to decompress neural elements at L3-L4 and L4-L5.   2. Anterior interbody fusion L3-L4 and L4-L5 with interbody fusion cage and bone graft.   3. Segmental instrumentation L3-L4-L5 with DePuy transpedicular fixation.   4. Posterolateral fusion L3-L4 and L4-L5 with bone graft.   5. Bone marrow aspiration through separate incision in the left posterior iliac crest.     When seen postop she is doing well. Her pain control is good. She denies nausea, shortness of breath or chest pain. No hx of DVT or PE.    ( Above is noted/ agree. Seen in her room afterwards. Doing ok. No f/c/n/vom/sob. Some postop pain. Noted that afterwards she ambulated 240 ft with PT with RW. No c/o LE pain.)wy.      Allergies:  Allergies   Allergen Reactions   • Medrol  "[Methylprednisolone] Other (See Comments)     \"STEROIDS MAKE MY JAW HURT\"       Meds:  Prescriptions Prior to Admission   Medication Sig Dispense Refill Last Dose   • b complex-C-folic acid 1 MG capsule Take 1 capsule by mouth Daily.   9/4/2018   • calcium carbonate (OS-KORY) 600 MG tablet Take 1,200 mg by mouth Daily.   9/4/2018   • cholecalciferol (VITAMIN D3) 1000 units tablet Take 2,000 Units by mouth Daily.   9/4/2018       History:   Past Medical History:   Diagnosis Date   • Back pain    • Spinal stenosis      Past Surgical History:   Procedure Laterality Date   • NO PAST SURGERIES       History reviewed. No pertinent family history.  Social History   Substance Use Topics   • Smoking status: Current Some Day Smoker     Packs/day: 1.00     Years: 44.00     Types: Cigarettes   • Smokeless tobacco: Never Used      Comment: DOWN TO 3 CIGARETTES PER DAY   • Alcohol use No   She is  with no children. She does factory work.    Review of Systems  Pertinent items are noted in HPI, all other systems reviewed and negative    Vital Signs  BP 95/50   Pulse 66   Temp 97.2 °F (36.2 °C)   Resp 16   SpO2 97%     Physical Exam:    General Appearance:    Alert, cooperative, in no acute distress   Head:    Normocephalic, without obvious abnormality, atraumatic   Eyes:            Lids and lashes normal, conjunctivae and sclerae normal, no   icterus, no pallor, corneas clear, PERRLA   Ears:    Ears appear intact with no abnormalities noted   Back:   Surgical dressing CDI. HV present   Lungs:     Clear to auscultation,respirations regular, even and                   unlabored    Heart:    Regular rhythm and normal rate, normal S1 and S2, no            murmur, no gallop, no rub, no click   Abdomen:     Normal bowel sounds, no masses, no organomegaly, soft        non-tender, non-distended, no guarding, no rebound                 tenderness   Genitalia:    Deferred. Moss- clear yellow urine   Extremities:   Moves all " extremities well, no edema, no cyanosis, no              redness   Pulses:   Pulses palpable and equal bilaterally   Skin:   No bleeding, bruising or rash   Neurologic:   Cranial nerves 2 - 12 grossly intact, sensation intact. Flexion and dorsiflexion intact bilateral feet.        I reviewed the patient's new clinical results.         Results from last 7 days  Lab Units 09/05/18  1331   WBC 10*3/mm3 7.91   HEMOGLOBIN g/dL 14.6   HEMATOCRIT % 44.6*   PLATELETS 10*3/mm3 300       Results from last 7 days  Lab Units 09/05/18  1331   GLUCOSE mg/dL 108*     Lab Results   Component Value Date    HGBA1C 5.90 (H) 09/05/2018       Assessment and Plan:   Active Problems:    Back pain    S/P lumbar fusion    Tobacco abuse    Prediabetes      Plan  1. PT- ambulate  2. Pain control-prns   3. IS-encourage  4. DVT proph- [facility]  5. Bowel regimen  6. Resume home medications as appropriate  7. Monitor post-op labs  8. DC planning for home    Tobacco abuse  - Nicotine patch    PreDM  - hgb A1c on 9/5/18 5.9  - Accuchecks ACHS with low dose SSI    I have personally performed the evaluation on this patient. My history is consistent  with HPI obtained. My exam findings are listed above. I have personally reviewed and discussed the above formulated treatment plan with pt, , and DENY Purdy  09/06/18  12:52 PM    Electronically signed by Rashid Kohli MD at 9/6/2018  5:01 PM     Rogelio Martinez MD at 9/6/2018  7:03 AM          Pre-Op H&P  Marlen Balderrama  [id number]  1960    Chief complaint: Low back pain into RLE, now with some into LLE    HPI:    Patient is a 58 y.o.female who presents with spinal stenosis and here today for lumbar laminectomy and fusion     Review of Systems:  General ROS: negative for chills, fever or skin lesions;  No changes since last office visit  Cardiovascular ROS: no chest pain or dyspnea on exertion  Respiratory ROS: no cough, shortness of breath, or  "wheezing    Allergies:   Allergies   Allergen Reactions   • Medrol [Methylprednisolone] Other (See Comments)     \"STEROIDS MAKE MY JAW HURT\"       Home Meds:    No current facility-administered medications on file prior to encounter.      No current outpatient prescriptions on file prior to encounter.       PMH:   Past Medical History:   Diagnosis Date   • Back pain    • Spinal stenosis      PSH:    Past Surgical History:   Procedure Laterality Date   • NO PAST SURGERIES         Social History:   Tobacco:   History   Smoking Status   • Current Some Day Smoker   • Packs/day: 1.00   • Years: 44.00   • Types: Cigarettes   Smokeless Tobacco   • Never Used     Comment: DOWN TO 3 CIGARETTES PER DAY      Alcohol:     History   Alcohol Use No       Vitals:          Afebrile HR 83 O2 97% /80  Physical Exam:  General Appearance:    Alert, cooperative, no distress, appears stated age   Head:    Normocephalic, without obvious abnormality, atraumatic   Lungs:     Clear to auscultation bilaterally, respirations unlabored    Heart:   Regular rate and rhythm, S1 and S2 normal, no murmur, rub    or gallop    Abdomen:    Soft, non-tender.  +bowel sounds   Breast Exam:    deferred   Genitalia:    deferred   Extremities:   Extremities normal, atraumatic, no cyanosis or edema   Skin:   Skin color, texture, turgor normal, no rashes or lesions   Neurologic:   Grossly intact   Results Review  I reviewed the patient's new clinical results.    Cancer Staging (if applicable)  Cancer Patient: __ yes _x_no __unknown; If yes, clinical stage T:__ N:__M:__, stage group or __N/A    Impression: Lumbar stenosis    Plan: Lumbar laminectomy and fusion    Melania Brizuela, APRN   9/6/2018   7:03 AM    Electronically signed by Rogelio Martinez MD at 9/7/2018  9:51 AM          Operative/Procedure Notes (most recent note)      Rogelio Martinez MD at 9/6/2018  8:47 AM        PREOPERATIVE DIAGNOSES:   1. Lumbar spinal stenosis L3-L4 and L4-L5.   2. " Segmental instability L3-L4 and L4-L5 with lateral listhesis.   3. Degenerative scoliosis L3-L4-L5.   4. Severe spondylosis/disc degeneration L3-L4 and L4-L5.     POSTOPERATIVE DIAGNOSES:   1. Lumbar spinal stenosis L3-L4 and L4-L5.   2. 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   3. Degenerative scoliosis L3-L4-L5.   4. Severe spondylosis/disc degeneration L3-L4 and L4-L5.     PROCEDURES PERFORMED:   1. Decompressive laminectomy, medial facetectomy and foraminotomies to decompress neural elements at L3-L4 and L4-L5.   2. Anterior interbody fusion L3-L4 and L4-L5 with interbody fusion cage and bone graft.   3. Segmental instrumentation L3-L4-L5 with DePuy transpedicular fixation.   4. Posterolateral fusion L3-L4 and L4-L5 with bone graft.   5. Bone marrow aspiration through separate incision in the left posterior iliac crest.     SURGEON: Rogelio Martinez MD    ASSISTANT: Fiorella Epstein PA-C    ANESTHESIA: General.     DESCRIPTION OF PROCEDURE: The patient was given a preoperative dose of intravenous Ancef. She was given a general anesthetic. She was placed in the prone position on the Gage table. The back was prepped and draped sterilely. A midline incision was made. The fascia was split and the paraspinal musculature elevated. L3-L4 and L4-L5 were identified by x-ray.     Pedicle screw instrumentation was placed 1st. Under C-arm guidance I placed pedicle screws at L3, L4, and L5 on the left and right sides. Bone was somewhat soft but screws obtained reasonable purchase. Two rods contoured in lordosis were placed on the screws and the appropriate set screws applied.     Next to decompress the severe spinal stenosis laminectomies were performed at L3-L4 and L4-L5. I started at L3-L4. A midline laminectomy was performed. A flavectomy was performed. There was a moderately severe central stenosis. I decompressed on the left and right sides. Bilateral medial facetectomies were performed at L3-L4.  Foraminotomies were performed. The neural elements at L3-L4 appeared decompressed. I then moved caudal to L4-L5. A laminectomy was performed at L4 and flavectomy. There was a moderately severe central stenosis. Bilateral medial facetectomies were performed. Foraminotomies were performed bilaterally. The neural elements at L4-L5 appeared decompressed.     Next, interbody fusions were performed. I started at L3-L4. I put these on the right side because that was the concavity of the curvature. At L3-L4 an annular window was created on the right side between the exiting nerve root above and traversing nerve root medially. These nerve roots were protected with retractors. A complete discectomy was performed at L3-L4 using a combination of endplate cameron, curets and pituitaries. The cage selected for L3-L4 was 10 mm in height and 28 mm in length. Prior to placing the cage I copiously irrigated the disc interspace. Bone graft was packed in the anterior disc space. I then packed a DePuy/Synthes interbody fusion cage with bone graft. This was impacted tangentially across the disc space. It locked into place well.     I then moved caudal to L4-L5. An interbody fusion was performed here. I created an annular window on the right side between the exiting nerve root above and traversing nerve root medially. A complete diskectomy was performed using combination of curets, pituitaries, and endplate cameron. The disc space at L4-L5 was somewhat smaller. I selected a cage 9 mm in height and 28 mm in length. It was packed with bone graft. Prior to placing the cage I copiously irrigated the disc space. I placed bone graft in the anterior disc space. I then packed the cage with bone graft and impacted it tangentially across the disc space. It locked into place well. I then compressed across the screws to lock the cage in place. The construct appeared solid.     A posterolateral fusion was performed. The transverse processes on the  left and right sides of L3-L4 and L4-L5 were decorticated. Bone graft was packed in the posterolateral gutters on the right and left sides.     It should be noted that bone graft was prepared from a bone marrow aspirate taken from the left posterior iliac crest. Through a separate stab incision I aspirated about 10 mL of bone marrow aspirate. This was mixed with some Vivigen allograft as well as locally harvested bone graft.  The bone graft was morselized and this was used as the fusion material.    A final torque tightening was done of all the screws. I could palpate the medial pedicle walls and there was no violation of the medial pedicle walls detectable by a Mcgarry ball-tip probe. I placed a deep Hemovac drain. Exposed dura was covered with thrombin-soaked Gelfoam.     The wound was closed in layers using Vicryl and skin staples. Final C-arm images showed all hardware in good position. The patient was awakened and transported to recovery room in stable condition.         Electronically signed by Rogelio Martinez MD at 2018  9:52 AM          Physician Progress Notes (most recent note)      Keyla Peralta APRN at 2018 12:46 PM          IM progress note      Marlen NANCE Tacos  [id number]  1960     LOS: 1 day     Attending: Rogelio Martinez MD    Primary Care Provider: Provider, No Known      Chief Complaint/Reason for visit:  Low back pain     Subjective   Doing well. Having moderate low back pain, denies leg pain. Denies f/c/n/v/sob/cp.    Objective     Vital Signs  Blood pressure 100/64, pulse 97, temperature 98 °F (36.7 °C), temperature source Temporal Artery , resp. rate 18, SpO2 98 %, not currently breastfeeding.  Temp (24hrs), Av.2 °F (36.8 °C), Min:97.4 °F (36.3 °C), Max:98.5 °F (36.9 °C)      Nutrition: PO    Respiratory: RA    Physical Therapy: Pt ambulated 250 feet with CGA and RW, limited by fatigue. Pt progressed to ther ex this date. Will continue to progress mobility as able.      Physical Exam:     General Appearance:    Alert, cooperative, in no acute distress   Head:    Normocephalic, without obvious abnormality, atraumatic    Lungs:     Normal effort, symmetric chest rise, no crepitus, clear to      auscultation bilaterally             Heart:    Regular rhythm and normal rate, normal S1 and S2   Abdomen:     Normal bowel sounds, no masses, no organomegaly, soft        non-tender, non-distended, no guarding, no rebound                tenderness   Extremities:   No clubbing, cyanosis or edema.  No deformities.    Pulses:   Pulses palpable and equal bilaterally   Skin:   No bleeding, bruising or rash. Back dressing CDI. HV present   Neurologic:   Moves all extremities with no obvious focal motor deficit.  Cranial nerves 2 - 12 grossly intact. Flexion and dorsiflexion intact bilateral feet.       Results Review:     I reviewed the patient's new clinical results.     Results from last 7 days  Lab Units 09/07/18  0742 09/05/18  1331   WBC 10*3/mm3 10.73 7.91   HEMOGLOBIN g/dL 10.0* 14.6   HEMATOCRIT % 30.3* 44.6*   PLATELETS 10*3/mm3 202 300       Results from last 7 days  Lab Units 09/07/18  0535 09/05/18  1331   SODIUM mmol/L 136  --    POTASSIUM mmol/L 4.5  --    CHLORIDE mmol/L 104  --    CO2 mmol/L 27.0  --    BUN mg/dL 7*  --    CREATININE mg/dL 0.73  --    CALCIUM mg/dL 8.4*  --    GLUCOSE mg/dL 97 108*     Results for DAVID VILLALPANDO (MRN [id number]) as of 9/7/2018 12:46   Ref. Range 9/6/2018 20:53 9/7/2018 05:35 9/7/2018 07:42 9/7/2018 07:54 9/7/2018 11:58   Glucose Latest Ref Range: 70 - 130 mg/dL 129 97  118 153 (H)     I reviewed the patient's new imaging including images and reports.    All medications reviewed.     famotidine 20 mg Intravenous Q12H   Or      famotidine 20 mg Oral Q12H   insulin lispro 0-7 Units Subcutaneous 4x Daily With Meals & Nightly   nicotine 1 patch Transdermal Q24H       Assessment/Plan   Principal Problem:    S/P lumbar fusion  Active Problems:     Back pain    Tobacco abuse    Prediabetes    Acute blood loss anemia, mild, asymptomatic    Acute postoperative pain      Plan  1. PT- ambulate  2. Pain control-prns   3. IS-encouraged  4. [facility]  5. Bowel regimen  6. Monitor post-op labs  7. DC planning for home, likely      Remove HV Saturday per Dr. Martinez    Tobacco abuse  - Nicotine patch     PreDM  - hgb A1c on 18 5.9  - Accuchecks ACHS with low dose SSI      DENY Hudson  18  12:48 PM    Electronically signed by Keyla Peralta APRN at 2018 12:49 PM       Consult Notes (most recent note)     No notes of this type exist for this encounter.           Physical Therapy Notes (most recent note)      Isidro Olvera, PT at 2018 11:58 AM  Version 1 of 1         Acute Care - Physical Therapy Treatment Note  [facility]     Patient Name: Marlen Balderrama  : 1960  MRN: [id number]  Today's Date: 2018  Onset of Illness/Injury or Date of Surgery: 18  Date of Referral to PT: 18  Referring Physician: MD Gm    Admit Date: 2018    Visit Dx:    ICD-10-CM ICD-9-CM   1. Impaired functional mobility, balance, gait, and endurance Z74.09 V49.89   2. Impaired mobility and ADLs Z74.09 799.89     Patient Active Problem List   Diagnosis   • Back pain   • S/P lumbar fusion   • Tobacco abuse   • Prediabetes       Therapy Treatment          Rehabilitation Treatment Summary     Row Name 18 1124             Treatment Time/Intention    Discipline physical therapist  -MJ      Document Type therapy note (daily note)  -MJ      Subjective Information complains of;pain  -MJ      Mode of Treatment physical therapy  -MJ      Patient/Family Observations Pt sitting UIC, hemovac, IV heplocked  -MJ      Patient Effort excellent  -MJ      Existing Precautions/Restrictions fall;spinal;other (see comments)   hemovac  -MJ      Recorded by [MJ] Isidro Olvera, PT 18 1156      Row Name 18 1124             Cognitive  Assessment/Intervention- PT/OT    Orientation Status (Cognition) oriented x 4  -MJ      Follows Commands (Cognition) WFL  -MJ      Recorded by [MJ] Isidro Olvera, PT 09/07/18 1156      Row Name 09/07/18 1124             Bed Mobility Assessment/Treatment    Sit-Supine Napoleon (Bed Mobility) supervision;verbal cues  -MJ      Comment (Bed Mobility) Pt performed log roll, verbal cues for sequencing  -MJ      Recorded by [MJ] Isidro Olvera, PT 09/07/18 1156      Row Name 09/07/18 1124             Transfer Assessment/Treatment    Transfer Assessment/Treatment sit-stand transfer;stand-sit transfer;toilet transfer  -MJ      Comment (Transfers) Verbal cues for correct hand placement. Assisted pt to bathroom, cues to reach back for grab bar prior to t/f. Pt independent with hygiene  -MJ      Recorded by [MJ] Isidro Olvera, PT 09/07/18 1156      Row Name 09/07/18 1124             Sit-Stand Transfer    Sit-Stand Napoleon (Transfers) contact guard;verbal cues  -MJ      Assistive Device (Sit-Stand Transfers) walker, front-wheeled  -MJ      Recorded by [MJ] Isidro Olvera, PT 09/07/18 1156      Row Name 09/07/18 1124             Stand-Sit Transfer    Stand-Sit Napoleon (Transfers) contact guard;verbal cues  -MJ      Assistive Device (Stand-Sit Transfers) walker, front-wheeled  -MJ      Recorded by [MJ] Isidro Olvera, PT 09/07/18 1156      Row Name 09/07/18 1124             Toilet Transfer    Type (Toilet Transfer) sit-stand;stand-sit  -MJ      Napoleon Level (Toilet Transfer) contact guard;verbal cues  -MJ      Assistive Device (Toilet Transfer) grab bars/safety frame;raised toilet seat;walker, front-wheeled  -MJ      Recorded by [MJ] Isidro Olvera, PT 09/07/18 1156      Row Name 09/07/18 1124             Gait/Stairs Assessment/Training    16720 - Gait Training Minutes  12  -MJ      Napoleon Level (Gait) contact guard;verbal cues  -MJ      Assistive Device (Gait) walker, front-wheeled  -MJ      Distance in Feet  (Gait) 250  -MJ      Pattern (Gait) step-through  -MJ      Deviations/Abnormal Patterns (Gait) bilateral deviations;shawn decreased;stride length decreased;base of support, narrow  -MJ      Bilateral Gait Deviations forward flexed posture;heel strike decreased  -MJ      Comment (Gait/Stairs) Pt demo step through gait pattern at slow pace. Verbal cues for upright posture and to stay in middle of RW. Pt initially exhibits narrow RULA, improved with cues to separate feet. Gait limited by fatigue  -MJ      Recorded by [MJ] Isidro Olvera, PT 09/07/18 1156      Row Name 09/07/18 1124             Motor Skills Assessment/Interventions    Additional Documentation Therapeutic Exercise (Group);Therapeutic Exercise Interventions (Group)  -MJ      Recorded by [MJ] Isidro Olvera, PT 09/07/18 1156      Row Name 09/07/18 1124             Therapeutic Exercise    47583 - PT Therapeutic Exercise Minutes 7  -MJ      07584 - PT Therapeutic Activity Minutes 5  -MJ      Recorded by [MJ] Isidro Olvera, PT 09/07/18 1156      Row Name 09/07/18 1124             Therapeutic Exercise    Lower Extremity (Therapeutic Exercise) gluteal sets;quad sets, bilateral  -MJ      Lower Extremity Range of Motion (Therapeutic Exercise) ankle dorsiflexion/plantar flexion, bilateral;hip internal/external rotation, bilateral  -MJ      Core Strength (Therapeutic Exercise) abdominal bracing  -MJ      Exercise Type (Therapeutic Exercise) AROM (active range of motion);isometric contraction, static  -MJ      Position (Therapeutic Exercise) supine  -MJ      Sets/Reps (Therapeutic Exercise) 10x each  -MJ      Comment (Therapeutic Exercise) Back protocol: cues for technique  -MJ      Recorded by [MJ] Isidro Olvera, PT 09/07/18 1156      Row Name 09/07/18 1124             Positioning and Restraints    Pre-Treatment Position sitting in chair/recliner  -MJ      Post Treatment Position bed  -MJ      In Bed notified nsg;supine;call light within reach;encouraged to call for  assist;SCD pump applied  -MJ      Recorded by [MJ] Isidro Olvera, PT 09/07/18 1156      Row Name 09/07/18 1124             Pain Scale: Numbers Pre/Post-Treatment    Pain Scale: Numbers, Pretreatment 10/10  -MJ      Pain Scale: Numbers, Post-Treatment 8/10  -MJ      Pain Location - Orientation lower  -MJ      Pain Location back  -MJ      Pain Intervention(s) Repositioned;Ambulation/increased activity  -MJ      Recorded by [MJ] Isidro Olvera, PT 09/07/18 1156      Row Name                Wound 09/06/18 1130 Other (See comments) back incision    Wound - Properties Group Date first assessed: 09/06/18 [TK] Time first assessed: 1130 [TK] Side: Other (See comments) [TK] Location: back [TK] Type: incision [TK] Recorded by:  [TK] Ani Ramey RN 09/06/18 1130    Row Name 09/07/18 1124             Plan of Care Review    Plan of Care Reviewed With patient  -MJ      Recorded by [MJ] Isidro Olvera, PT 09/07/18 1156      Row Name 09/07/18 1124             Outcome Summary/Treatment Plan (PT)    Daily Summary of Progress (PT) progress toward functional goals is good  -MJ      Recorded by [RAQUEL] Isidro Olvera, PT 09/07/18 1156        User Key  (r) = Recorded By, (t) = Taken By, (c) = Cosigned By    Initials Name Effective Dates Discipline    TK Ani Ramey RN 06/16/16 -  Nurse    Isidro Manning, PT 04/03/18 -  PT          Wound 09/06/18 1130 Other (See comments) back incision (Active)   Dressing Appearance dry;intact 9/7/2018  8:04 AM   Drainage Amount none 9/7/2018  8:04 AM   Dressing Care, Wound gauze 9/7/2018  8:04 AM             Physical Therapy Education     Title: PT OT SLP Therapies (Done)     Topic: Physical Therapy (Done)     Point: Mobility training (Done)    Learning Progress Summary     Learner Status Readiness Method Response Comment Documented by    Patient Done Acceptance E,D KRYSTAL,BASILIA Reviewed back precautions, log roll, HEP, gait mechanics, benefits of mobility  09/07/18 1156     Done Acceptance E,D,H KRYSTAL,BASILIA Issued and  reviewed handout regarding spinal precautions. Educated on spinal precautions, correct log rolling technique, correct gait mechanics, benefits of frequent ambulation, and progression of POC.  09/06/18 1627          Point: Home exercise program (Done)    Learning Progress Summary     Learner Status Readiness Method Response Comment Documented by    Patient Done Acceptance E,D KRYSTAL,NR Reviewed back precautions, log roll, HEP, gait mechanics, benefits of mobility  09/07/18 1156     Done Acceptance SYD,TOBI,H KRYSTAL,NR Issued and reviewed handout regarding spinal precautions. Educated on spinal precautions, correct log rolling technique, correct gait mechanics, benefits of frequent ambulation, and progression of POC.  09/06/18 1627          Point: Body mechanics (Done)    Learning Progress Summary     Learner Status Readiness Method Response Comment Documented by    Patient Done Acceptance TOBI VELARDE,NR Reviewed back precautions, log roll, HEP, gait mechanics, benefits of mobility  09/07/18 1156     Done Acceptance SYD,TOBI,H KRYSTAL,NR Issued and reviewed handout regarding spinal precautions. Educated on spinal precautions, correct log rolling technique, correct gait mechanics, benefits of frequent ambulation, and progression of POC.  09/06/18 1627          Point: Precautions (Done)    Learning Progress Summary     Learner Status Readiness Method Response Comment Documented by    Patient Done Acceptance SYD,D KRYSTAL,NR Reviewed back precautions, log roll, HEP, gait mechanics, benefits of mobility  09/07/18 1156     Done Acceptance SYD,TOBI,H KRYSTAL,NR Issued and reviewed handout regarding spinal precautions. Educated on spinal precautions, correct log rolling technique, correct gait mechanics, benefits of frequent ambulation, and progression of POC.  09/06/18 1627                      User Key     Initials Effective Dates Name Provider Type Discipline     06/19/15 -  Karin Correia, PT Physical Therapist PT     04/03/18 -  May  Isidro, PT Physical Therapist PT                    PT Recommendation and Plan     Outcome Summary/Treatment Plan (PT)  Daily Summary of Progress (PT): progress toward functional goals is good  Plan of Care Reviewed With: patient  Progress: improving  Outcome Summary: Pt ambulated 250 feet with CGA and RW, limited by fatigue. Pt progressed to ther ex this date. Will continue to progress mobility as able.           Outcome Measures     Row Name 09/07/18 1124 09/07/18 0814 09/06/18 1535       How much help from another person do you currently need...    Turning from your back to your side while in flat bed without using bedrails? 3  -MJ  -- 3  -LR    Moving from lying on back to sitting on the side of a flat bed without bedrails? 3  -MJ  -- 3  -LR    Moving to and from a bed to a chair (including a wheelchair)? 3  -MJ  -- 3  -LR    Standing up from a chair using your arms (e.g., wheelchair, bedside chair)? 3  -MJ  -- 3  -LR    Climbing 3-5 steps with a railing? 3  -MJ  -- 3  -LR    To walk in hospital room? 3  -MJ  -- 3  -LR    AM-PAC 6 Clicks Score 18  -MJ  -- 18  -LR       How much help from another is currently needed...    Putting on and taking off regular lower body clothing?  -- 3  -EDGARDO  --    Bathing (including washing, rinsing, and drying)  -- 3  -EDGARDO  --    Toileting (which includes using toilet bed pan or urinal)  -- 3  -EDGARDO  --    Putting on and taking off regular upper body clothing  -- 3  -EDGARDO  --    Taking care of personal grooming (such as brushing teeth)  -- 4  -EDGARDO  --    Eating meals  -- 4  -EDGARDO  --    Score  -- 20  -EDGARDO  --       Functional Assessment    Outcome Measure Options AM-PAC 6 Clicks Basic Mobility (PT)  -MJ AM-PAC 6 Clicks Daily Activity (OT)  -EDGARDO AM-PAC 6 Clicks Basic Mobility (PT)  -LR      User Key  (r) = Recorded By, (t) = Taken By, (c) = Cosigned By    Initials Name Provider Type    Indira Jonas, OT Occupational Therapist    LR Karin Correia, PT Physical Therapist    RAQUEL Olvera,  Isidro PT Physical Therapist           Time Calculation:         PT Charges     Row Name 09/07/18 1124             Time Calculation    Start Time 1124  -MJ      PT Received On 09/07/18  -MJ      PT Goal Re-Cert Due Date 09/16/18  -MJ         Time Calculation- PT    Total Timed Code Minutes- PT 24 minute(s)  -MJ         Timed Charges    41054 - PT Therapeutic Exercise Minutes 7  -MJ      86738 - Gait Training Minutes  12  -MJ      78066 - PT Therapeutic Activity Minutes 5  -MJ        User Key  (r) = Recorded By, (t) = Taken By, (c) = Cosigned By    Initials Name Provider Type    Isidro Manning, PT Physical Therapist        Therapy Suggested Charges     Code   Minutes Charges    50139 (CPT®) Hc Pt Neuromusc Re Education Ea 15 Min      07895 (CPT®) Hc Pt Ther Proc Ea 15 Min 7 1    79906 (CPT®) Hc Gait Training Ea 15 Min 12 1    84454 (CPT®) Hc Pt Therapeutic Act Ea 15 Min 5     63505 (CPT®) Hc Pt Manual Therapy Ea 15 Min      57965 (CPT®) Hc Pt Iontophoresis Ea 15 Min      62769 (CPT®) Hc Pt Elec Stim Ea-Per 15 Min      64507 (CPT®) Hc Pt Ultrasound Ea 15 Min      53632 (CPT®) Hc Pt Self Care/Mgmt/Train Ea 15 Min      41243 (CPT®) Hc Pt Prosthetic (S) Train Initial Encounter, Each 15 Min      44933 (CPT®) Hc Pt Orthotic(S)/Prosthetic(S) Encounter, Each 15 Min      91179 (CPT®) Hc Orthotic(S) Mgmt/Train Initial Encounter, Each 15min      Total  24 2        Therapy Charges for Today     Code Description Service Date Service Provider Modifiers Qty    44601653176 HC PT THER PROC EA 15 MIN 9/7/2018 Isidro Olvera, PT GP 1    53354736000 HC GAIT TRAINING EA 15 MIN 9/7/2018 Isidro Olvera, PT GP 1          PT G-Codes  Outcome Measure Options: AM-PAC 6 Clicks Basic Mobility (PT)  AM-PAC 6 Clicks Score: 18  Score: 20    Isidro Olvera PT  9/7/2018         Electronically signed by Isidro Olvera, PT at 9/7/2018 11:58 AM

## 2018-09-08 LAB
GLUCOSE BLDC GLUCOMTR-MCNC: 104 MG/DL (ref 70–130)
GLUCOSE BLDC GLUCOMTR-MCNC: 104 MG/DL (ref 70–130)
GLUCOSE BLDC GLUCOMTR-MCNC: 117 MG/DL (ref 70–130)
GLUCOSE BLDC GLUCOMTR-MCNC: 158 MG/DL (ref 70–130)

## 2018-09-08 PROCEDURE — 97116 GAIT TRAINING THERAPY: CPT

## 2018-09-08 PROCEDURE — 82962 GLUCOSE BLOOD TEST: CPT

## 2018-09-08 RX ADMIN — INSULIN LISPRO 2 UNITS: 100 INJECTION, SOLUTION INTRAVENOUS; SUBCUTANEOUS at 12:40

## 2018-09-08 RX ADMIN — OXYCODONE HYDROCHLORIDE AND ACETAMINOPHEN 1 TABLET: 10; 325 TABLET ORAL at 20:44

## 2018-09-08 RX ADMIN — OXYCODONE HYDROCHLORIDE AND ACETAMINOPHEN 1 TABLET: 10; 325 TABLET ORAL at 12:40

## 2018-09-08 RX ADMIN — FAMOTIDINE 20 MG: 20 TABLET ORAL at 07:57

## 2018-09-08 RX ADMIN — BISACODYL 10 MG: 10 SUPPOSITORY RECTAL at 17:49

## 2018-09-08 RX ADMIN — FAMOTIDINE 20 MG: 20 TABLET ORAL at 20:43

## 2018-09-08 RX ADMIN — NICOTINE 1 PATCH: 21 PATCH, EXTENDED RELEASE TRANSDERMAL at 07:58

## 2018-09-08 RX ADMIN — OXYCODONE HYDROCHLORIDE AND ACETAMINOPHEN 1 TABLET: 10; 325 TABLET ORAL at 04:25

## 2018-09-08 RX ADMIN — OXYCODONE HYDROCHLORIDE AND ACETAMINOPHEN 1 TABLET: 10; 325 TABLET ORAL at 07:56

## 2018-09-08 RX ADMIN — NICOTINE 1 PATCH: 21 PATCH, EXTENDED RELEASE TRANSDERMAL at 08:01

## 2018-09-08 RX ADMIN — BISACODYL 5 MG: 5 TABLET, COATED ORAL at 07:57

## 2018-09-08 RX ADMIN — OXYCODONE HYDROCHLORIDE AND ACETAMINOPHEN 1 TABLET: 10; 325 TABLET ORAL at 16:26

## 2018-09-08 NOTE — PROGRESS NOTES
Ortho Spine Progress Note     LOS: 1 day   Patient Care Team:  Provider, No Known as PCP - General    Subjective: Complains of back sore.  Pain improving.  No complaints of leg pain.    Objective:   Vital Signs:  /61 (BP Location: Right arm, Patient Position: Lying)   Pulse 100   Temp 98.4 °F (36.9 °C) (Oral)   Resp 16   SpO2 97%     Labs:  Lab Results (last 24 hours)     Procedure Component Value Units Date/Time    POC Glucose Once [[id number]]  (Normal) Collected:  09/08/18 0718    Specimen:  Blood Updated:  09/08/18 0719     Glucose 104 mg/dL     Narrative:       Meter: [id number] : [id number] Abigail Berg    POC Glucose Once [[id number]]  (Abnormal) Collected:  09/07/18 2039    Specimen:  Blood Updated:  09/07/18 2041     Glucose 135 (H) mg/dL     Narrative:       Meter: [id number] : [id number] Sulaiman Bazzi    POC Glucose Once [[id number]]  (Normal) Collected:  09/07/18 1607    Specimen:  Blood Updated:  09/07/18 1608     Glucose 112 mg/dL     Narrative:       Meter: [id number] : [id number] Abigail Berg    POC Glucose Once [[id number]]  (Abnormal) Collected:  09/07/18 1158    Specimen:  Blood Updated:  09/07/18 1159     Glucose 153 (H) mg/dL     Narrative:       Meter: [id number] : [id number] Abigail Berg          Awake, alert, oriented.  Sitting up in bed.  Motor intact in lower extremities.    Assessment/Plan: Seems to be doing well.  I think she can go home tomorrow, Sunday.  I have discussed follow-up care and restrictions with her.  I have left a prescription for Percocet 7.5, #50, one by mouth every 6 when necessary pain.  Meds have been made for home physical therapy and staple removal 2.0-2.5 weeks postop.  I will see her back in the office in 3-4 weeks.    Rogelio Martinez MD  09/08/18  10:59 AM

## 2018-09-08 NOTE — PLAN OF CARE
Problem: Patient Care Overview  Goal: Plan of Care Review  Outcome: Ongoing (interventions implemented as appropriate)   09/08/18 1431   Plan of Care Review   Progress improving   OTHER   Outcome Summary Pt ambulated 300 feet with CGA and RW, limited by fatigue. Pt anticipates discharge home tomorrow, will continue to progress mobility as able.   Coping/Psychosocial   Plan of Care Reviewed With patient       Problem: Laminectomy/Foraminotomy/Discectomy (Adult)  Goal: Signs and Symptoms of Listed Potential Problems Will be Absent, Minimized or Managed (Laminectomy/Foraminotomy/Discectomy)  Outcome: Ongoing (interventions implemented as appropriate)   09/08/18 1431   Goal/Outcome Evaluation   Problems Assessed (Laminectomy/Laminotomy/Discectomy) functional decline/self-care deficit;pain   Problems Present (Laminectomy/otomy) functional decline/self-care deficit;pain

## 2018-09-08 NOTE — PLAN OF CARE
Problem: Patient Care Overview  Goal: Plan of Care Review  Outcome: Ongoing (interventions implemented as appropriate)   09/08/18 8953   Plan of Care Review   Progress improving   OTHER   Outcome Summary pt got drain out today; covaderm applied; voiding well; ambulated in hallway; pain managed with prn meds. plan for d/c tomorrow.    Coping/Psychosocial   Plan of Care Reviewed With patient     Goal: Individualization and Mutuality  Outcome: Ongoing (interventions implemented as appropriate)    Goal: Discharge Needs Assessment  Outcome: Ongoing (interventions implemented as appropriate)    Goal: Interprofessional Rounds/Family Conf  Outcome: Ongoing (interventions implemented as appropriate)      Problem: Laminectomy/Foraminotomy/Discectomy (Adult)  Goal: Signs and Symptoms of Listed Potential Problems Will be Absent, Minimized or Managed (Laminectomy/Foraminotomy/Discectomy)  Outcome: Ongoing (interventions implemented as appropriate)      Problem: Pain, Acute (Adult)  Goal: Identify Related Risk Factors and Signs and Symptoms  Outcome: Ongoing (interventions implemented as appropriate)    Goal: Acceptable Pain Control/Comfort Level  Outcome: Ongoing (interventions implemented as appropriate)

## 2018-09-08 NOTE — THERAPY TREATMENT NOTE
Acute Care - Physical Therapy Treatment Note  [facility]     Patient Name: Marlen Balderrama  : 1960  MRN: [id number]  Today's Date: 2018  Onset of Illness/Injury or Date of Surgery: 18  Date of Referral to PT: 18  Referring Physician: MD Gm    Admit Date: 2018    Visit Dx:    ICD-10-CM ICD-9-CM   1. Impaired functional mobility, balance, gait, and endurance Z74.09 V49.89   2. Impaired mobility and ADLs Z74.09 799.89     Patient Active Problem List   Diagnosis   • Back pain   • S/P lumbar fusion   • Tobacco abuse   • Prediabetes   • Acute blood loss anemia, mild, asymptomatic   • Acute postoperative pain       Therapy Treatment          Rehabilitation Treatment Summary     Row Name 18 1359             Treatment Time/Intention    Discipline physical therapist  -      Document Type therapy note (daily note)  -      Subjective Information complains of;pain  -MJ      Mode of Treatment physical therapy  -MJ      Patient/Family Observations Pt supine in bed, IV heplocked. Family present  -      Care Plan Review patient/other agree to care plan  -      Care Plan Review, Other Participant(s) spouse  -MJ      Patient Effort excellent  -MJ      Existing Precautions/Restrictions fall;spinal  -MJ      Recorded by [MJ] Isidro Olvera, PT 18 1431      Row Name 18 1358             Cognitive Assessment/Intervention- PT/OT    Orientation Status (Cognition) oriented x 4  -MJ      Follows Commands (Cognition) WFL  -MJ      Recorded by [MJ] Isidro Olvera, PT 18 1431      Row Name 18 1769             Bed Mobility Assessment/Treatment    Supine-Sit Spur (Bed Mobility) supervision;verbal cues  -MJ      Sit-Supine Spur (Bed Mobility) supervision;verbal cues  -MJ      Assistive Device (Bed Mobility) bed rails  -MJ      Comment (Bed Mobility) Pt performed log roll into/out of bed, verbal cues for sequencing  -MJ      Recorded by [MJ] Isidro Olvera, PT  09/08/18 1431      Row Name 09/08/18 1359             Transfer Assessment/Treatment    Transfer Assessment/Treatment sit-stand transfer;stand-sit transfer  -MJ      Comment (Transfers) Verbal cues for correct hand placement. Assisted pt to bathroom, cues to reach back for grab bar prior to t/f  -MJ      Recorded by [MJ] Isidro Olvera, PT 09/08/18 1431      Row Name 09/08/18 1359             Sit-Stand Transfer    Sit-Stand Amity (Transfers) contact guard;verbal cues  -MJ      Assistive Device (Sit-Stand Transfers) walker, front-wheeled  -MJ      Recorded by [MJ] Isidro Olvera, PT 09/08/18 1431      Row Name 09/08/18 1359             Stand-Sit Transfer    Stand-Sit Amity (Transfers) contact guard;verbal cues  -MJ      Assistive Device (Stand-Sit Transfers) walker, front-wheeled  -MJ      Recorded by [MJ] Isidro Olvera, PT 09/08/18 1431      Row Name 09/08/18 1359             Toilet Transfer    Type (Toilet Transfer) sit-stand;stand-sit  -MJ      Amity Level (Toilet Transfer) supervision;verbal cues  -MJ      Assistive Device (Toilet Transfer) grab bars/safety frame;raised toilet seat;walker, front-wheeled  -MJ      Recorded by [MJ] Isidro Olvera, PT 09/08/18 1431      Row Name 09/08/18 1359             Gait/Stairs Assessment/Training    54637 - Gait Training Minutes  12  -MJ      Amity Level (Gait) contact guard;verbal cues  -MJ      Assistive Device (Gait) walker, front-wheeled  -MJ      Distance in Feet (Gait) 300  -MJ      Pattern (Gait) step-through  -MJ      Deviations/Abnormal Patterns (Gait) bilateral deviations;shawn decreased;stride length decreased  -MJ      Bilateral Gait Deviations forward flexed posture;heel strike decreased  -MJ      Comment (Gait/Stairs) Pt demo step through gait pattern at slow pace. Verbal cues for upright posture and to increase LE weight bearing, mild improvement. Gait limited by pain and fatigue  -MJ      Recorded by [MJ] Isidro Olvera, PT 09/08/18 1431       Row Name 09/08/18 1359             Therapeutic Exercise    98041 - PT Therapeutic Activity Minutes 5  -MJ      Recorded by [MJ] Isidro Olvera, PT 09/08/18 1431      Row Name 09/08/18 1359             Positioning and Restraints    Pre-Treatment Position in bed  -MJ      Post Treatment Position bed  -MJ      In Bed notified nsg;side lying left;call light within reach;encouraged to call for assist;with family/caregiver  -MJ      Recorded by [MJ] Isidro Olvera, PT 09/08/18 1431      Row Name 09/08/18 1359             Pain Scale: Numbers Pre/Post-Treatment    Pain Scale: Numbers, Pretreatment 8/10  -MJ      Pain Scale: Numbers, Post-Treatment 6/10  -MJ      Pain Location - Orientation lower  -MJ      Pain Location back  -MJ      Pain Intervention(s) Repositioned;Ambulation/increased activity  -MJ      Recorded by [RAQUEL] Isidro Olvera, PT 09/08/18 1431      Row Name                Wound 09/06/18 1130 Other (See comments) back incision    Wound - Properties Group Date first assessed: 09/06/18 [TK] Time first assessed: 1130 [TK] Side: Other (See comments) [TK] Location: back [TK] Type: incision [TK] Recorded by:  [TK] Ani Ramey RN 09/06/18 1130    Row Name 09/08/18 1359             Plan of Care Review    Plan of Care Reviewed With patient  -MJ      Recorded by [RAQUEL] Isidro Olvera, PT 09/08/18 1431      Row Name 09/08/18 1359             Outcome Summary/Treatment Plan (PT)    Daily Summary of Progress (PT) progress toward functional goals is good  -MJ      Recorded by [RAQUEL] Isidro Olvera, PT 09/08/18 1431        User Key  (r) = Recorded By, (t) = Taken By, (c) = Cosigned By    Initials Name Effective Dates Discipline    TK Ani Ramey RN 06/16/16 -  Nurse    Isidro Manning, PT 04/03/18 -  PT          Wound 09/06/18 1130 Other (See comments) back incision (Active)   Dressing Appearance dry;intact 9/8/2018  8:00 AM   Drainage Amount none 9/8/2018  8:00 AM   Dressing Care, Wound gauze 9/8/2018  8:00 AM   Wound Output (mL) 25  9/8/2018  8:00 AM             Physical Therapy Education     Title: PT OT SLP Therapies (Done)     Topic: Physical Therapy (Done)     Point: Mobility training (Done)    Learning Progress Summary     Learner Status Readiness Method Response Comment Documented by    Patient Done Acceptance TOBI VELARDE NR Reviewed back precautions, log roll, gait mechanics  09/08/18 1431     Done Acceptance TOBI VELARDE NR Reviewed back precautions, log roll, HEP, gait mechanics, benefits of mobility  09/07/18 1156     Done Acceptance TOBI VELARDE,H BASILIA RICE Issued and reviewed handout regarding spinal precautions. Educated on spinal precautions, correct log rolling technique, correct gait mechanics, benefits of frequent ambulation, and progression of POC.  09/06/18 1627          Point: Home exercise program (Done)    Learning Progress Summary     Learner Status Readiness Method Response Comment Documented by    Patient Done Acceptance TOBI VELARDE NR Reviewed back precautions, log roll, gait mechanics  09/08/18 1431     Done Acceptance TOBI VELARDE NR Reviewed back precautions, log roll, HEP, gait mechanics, benefits of mobility  09/07/18 1156     Done Acceptance TOBI VELARDE,BASILIA MAHAN Issued and reviewed handout regarding spinal precautions. Educated on spinal precautions, correct log rolling technique, correct gait mechanics, benefits of frequent ambulation, and progression of POC.  09/06/18 1627          Point: Body mechanics (Done)    Learning Progress Summary     Learner Status Readiness Method Response Comment Documented by    Patient Done Acceptance TOBI VELARDE NR Reviewed back precautions, log roll, gait mechanics  09/08/18 1431     Done Acceptance TOBI VELARDE NR Reviewed back precautions, log roll, HEP, gait mechanics, benefits of mobility  09/07/18 1156     Done Acceptance TOBI VELARDE,BASILIA MAHAN Issued and reviewed handout regarding spinal precautions. Educated on spinal precautions, correct log rolling technique, correct gait mechanics, benefits of frequent ambulation, and  progression of POC.  09/06/18 1627          Point: Precautions (Done)    Learning Progress Summary     Learner Status Readiness Method Response Comment Documented by    Patient Done Acceptance TOBI VELARDE NR Reviewed back precautions, log roll, gait mechanics  09/08/18 1431     Done Acceptance TOBI VELARDE NR Reviewed back precautions, log roll, HEP, gait mechanics, benefits of mobility  09/07/18 1156     Done Acceptance TOBI VELARDE,H BASILIA RICE Issued and reviewed handout regarding spinal precautions. Educated on spinal precautions, correct log rolling technique, correct gait mechanics, benefits of frequent ambulation, and progression of POC.  09/06/18 1627                      User Key     Initials Effective Dates Name Provider Type Discipline     06/19/15 -  Karin Correia, PT Physical Therapist PT     04/03/18 -  Isidro Olvera, PT Physical Therapist PT                    PT Recommendation and Plan     Outcome Summary/Treatment Plan (PT)  Daily Summary of Progress (PT): progress toward functional goals is good  Plan of Care Reviewed With: patient  Progress: improving  Outcome Summary: Pt ambulated 300 feet with CGA and RW, limited by fatigue. Pt anticipates discharge home tomorrow, will continue to progress mobility as able.          Outcome Measures     Row Name 09/08/18 1359 09/07/18 1124 09/07/18 0814       How much help from another person do you currently need...    Turning from your back to your side while in flat bed without using bedrails? 3  -MJ 3  -MJ  --    Moving from lying on back to sitting on the side of a flat bed without bedrails? 3  -MJ 3  -MJ  --    Moving to and from a bed to a chair (including a wheelchair)? 3  -MJ 3  -MJ  --    Standing up from a chair using your arms (e.g., wheelchair, bedside chair)? 3  -MJ 3  -MJ  --    Climbing 3-5 steps with a railing? 3  -MJ 3  -MJ  --    To walk in hospital room? 3  -MJ 3  -MJ  --    AM-PAC 6 Clicks Score 18  -MJ 18  -MJ  --       How much help from  another is currently needed...    Putting on and taking off regular lower body clothing?  --  -- 3  -EDGARDO    Bathing (including washing, rinsing, and drying)  --  -- 3  -EDGARDO    Toileting (which includes using toilet bed pan or urinal)  --  -- 3  -EDGARDO    Putting on and taking off regular upper body clothing  --  -- 3  -EDGARDO    Taking care of personal grooming (such as brushing teeth)  --  -- 4  -EDGARDO    Eating meals  --  -- 4  -EDGARDO    Score  --  -- 20  -EDGARDO       Functional Assessment    Outcome Measure Options AM-PAC 6 Clicks Basic Mobility (PT)  - AM-PAC 6 Clicks Basic Mobility (PT)  -[facility] 6 Clicks Daily Activity (OT)  -    Row Name 09/06/18 1535             How much help from another person do you currently need...    Turning from your back to your side while in flat bed without using bedrails? 3  -LR      Moving from lying on back to sitting on the side of a flat bed without bedrails? 3  -LR      Moving to and from a bed to a chair (including a wheelchair)? 3  -LR      Standing up from a chair using your arms (e.g., wheelchair, bedside chair)? 3  -LR      Climbing 3-5 steps with a railing? 3  -LR      To walk in hospital room? 3  -LR      AM-PAC 6 Clicks Score 18  -LR         Functional Assessment    Outcome Measure Options AM-[facility] 6 Clicks Basic Mobility (PT)  -LR        User Key  (r) = Recorded By, (t) = Taken By, (c) = Cosigned By    Initials Name Provider Type    Indira Jonas, OT Occupational Therapist    LR Karin Correia, PT Physical Therapist    Isidro Manning, PT Physical Therapist           Time Calculation:         PT Charges     Row Name 09/08/18 1359             Time Calculation    Start Time 1359  -MJ      PT Received On 09/08/18  -      PT Goal Re-Cert Due Date 09/16/18  -MJ         Time Calculation- PT    Total Timed Code Minutes- PT 17 minute(s)  -MJ         Timed Charges    63970 - Gait Training Minutes  12  -MJ      89662 - PT Therapeutic Activity Minutes 5  -MJ        User Key   (r) = Recorded By, (t) = Taken By, (c) = Cosigned By    Initials Name Provider Type    MJ Isidro Olvera, PT Physical Therapist        Therapy Suggested Charges     Code   Minutes Charges    38390 (CPT®) Hc Pt Neuromusc Re Education Ea 15 Min      66757 (CPT®) Hc Pt Ther Proc Ea 15 Min      93347 (CPT®) Hc Gait Training Ea 15 Min 12 1    11997 (CPT®) Hc Pt Therapeutic Act Ea 15 Min 5     40163 (CPT®) Hc Pt Manual Therapy Ea 15 Min      58950 (CPT®) Hc Pt Iontophoresis Ea 15 Min      78087 (CPT®) Hc Pt Elec Stim Ea-Per 15 Min      63225 (CPT®) Hc Pt Ultrasound Ea 15 Min      52065 (CPT®) Hc Pt Self Care/Mgmt/Train Ea 15 Min      00063 (CPT®) Hc Pt Prosthetic (S) Train Initial Encounter, Each 15 Min      04146 (CPT®) Hc Pt Orthotic(S)/Prosthetic(S) Encounter, Each 15 Min      73116 (CPT®) Hc Orthotic(S) Mgmt/Train Initial Encounter, Each 15min      Total  17 1        Therapy Charges for Today     Code Description Service Date Service Provider Modifiers Qty    97215391527 HC PT THER PROC EA 15 MIN 9/7/2018 Isidro Olvera, PT GP 1    96298123132 HC GAIT TRAINING EA 15 MIN 9/7/2018 Isidro Olvera, PT GP 1    76026613393 HC GAIT TRAINING EA 15 MIN 9/8/2018 Isidro Olvera, PT GP 1          PT G-Codes  Outcome Measure Options: AM-PAC 6 Clicks Basic Mobility (PT)  AM-PAC 6 Clicks Score: 18  Score: 20    Isidro Olvera PT  9/8/2018

## 2018-09-08 NOTE — PROGRESS NOTES
IM progress note      Marlen Balderrama  [id number]  1960     LOS: 1 day     Attending: Rogelio Martinez MD    Primary Care Provider: Provider, No Known      Chief Complaint/Reason for visit:  Low back pain     Subjective   Still with some pain. But progressing well with rehab. Hv was removed this am. No f/c/n/vom/sob.     Objective     Vital Signs  Blood pressure 115/61, pulse 100, temperature 98.4 °F (36.9 °C), temperature source Oral, resp. rate 16, SpO2 97 %, not currently breastfeeding.  Temp (24hrs), Av.4 °F (36.9 °C), Min:98 °F (36.7 °C), Max:98.8 °F (37.1 °C)      Nutrition: PO    Respiratory: RA    Physical Therapy: Pt ambulated 250 feet with CGA and RW, limited by fatigue. Pt progressed to ther ex this date. Will continue to progress mobility as able.     Physical Exam:     General Appearance:    Alert, cooperative, in no acute distress   Head:    Normocephalic, without obvious abnormality, atraumatic    Lungs:     Normal effort, symmetric chest rise, no crepitus, clear to      auscultation bilaterally             Heart:    Regular rhythm and normal rate, normal S1 and S2   Abdomen:     Normal bowel sounds, no masses, no organomegaly, soft        non-tender, non-distended, no guarding, no rebound                tenderness   Extremities:   No clubbing, cyanosis or edema.  No deformities.    Pulses:   Pulses palpable and equal bilaterally   Skin:   No bleeding, bruising or rash. Back dressing CDI. HV is out.   Neurologic:   Moves all extremities with no obvious focal motor deficit.  Cranial nerves 2 - 12 grossly intact. Flexion and dorsiflexion intact bilateral feet.       Results Review:     I reviewed the patient's new clinical results.     Results from last 7 days  Lab Units 18  0742 18  1331   WBC 10*3/mm3 10.73 7.91   HEMOGLOBIN g/dL 10.0* 14.6   HEMATOCRIT % 30.3* 44.6*   PLATELETS 10*3/mm3 202 300       Results from last 7 days  Lab Units 18  0535 18  1331   SODIUM  mmol/L 136  --    POTASSIUM mmol/L 4.5  --    CHLORIDE mmol/L 104  --    CO2 mmol/L 27.0  --    BUN mg/dL 7*  --    CREATININE mg/dL 0.73  --    CALCIUM mg/dL 8.4*  --    GLUCOSE mg/dL 97 108*        Results for DAVID VILLALPANDO (MRN [id number]) as of 9/8/2018 11:45   Ref. Range 9/7/2018 11:58 9/7/2018 16:07 9/7/2018 20:39 9/8/2018 07:18   Glucose Latest Ref Range: 70 - 130 mg/dL 153 (H) 112 135 (H) 104     I reviewed the patient's new imaging including images and reports.    All medications reviewed.     famotidine 20 mg Intravenous Q12H   Or      famotidine 20 mg Oral Q12H   insulin lispro 0-7 Units Subcutaneous 4x Daily With Meals & Nightly   nicotine 1 patch Transdermal Q24H       Assessment/Plan   Principal Problem:    S/P lumbar fusion  Active Problems:    Back pain    Tobacco abuse    Prediabetes    Acute blood loss anemia, mild, asymptomatic    Acute postoperative pain      Plan  1. PT- ambulate  2. Pain control-prns   3. IS-encouraged  4. DVT proph- [facility]  5. Bowel regimen  6. DC planning for home, likely Sunday     Remove HV Saturday per Dr. Martinez    Tobacco abuse  - Nicotine patch     PreDM  - hgb A1c on 9/5/18 5.9  - Accuchecks ACHS with low dose SSI    D/w pt and .    Rashid Kohli MD  09/08/18  11:44 AM

## 2018-09-09 VITALS
OXYGEN SATURATION: 95 % | DIASTOLIC BLOOD PRESSURE: 58 MMHG | HEART RATE: 101 BPM | RESPIRATION RATE: 16 BRPM | SYSTOLIC BLOOD PRESSURE: 113 MMHG | TEMPERATURE: 98.1 F

## 2018-09-09 LAB
GLUCOSE BLDC GLUCOMTR-MCNC: 114 MG/DL (ref 70–130)
GLUCOSE BLDC GLUCOMTR-MCNC: 218 MG/DL (ref 70–130)

## 2018-09-09 PROCEDURE — 82962 GLUCOSE BLOOD TEST: CPT

## 2018-09-09 PROCEDURE — 97535 SELF CARE MNGMENT TRAINING: CPT

## 2018-09-09 PROCEDURE — 97116 GAIT TRAINING THERAPY: CPT

## 2018-09-09 PROCEDURE — 97110 THERAPEUTIC EXERCISES: CPT

## 2018-09-09 RX ORDER — DOCUSATE SODIUM 100 MG/1
100 CAPSULE, LIQUID FILLED ORAL 2 TIMES DAILY
Qty: 60 CAPSULE | Refills: 0 | Status: SHIPPED | OUTPATIENT
Start: 2018-09-09

## 2018-09-09 RX ORDER — OXYCODONE AND ACETAMINOPHEN 7.5; 325 MG/1; MG/1
1 TABLET ORAL EVERY 6 HOURS PRN
Qty: 50 TABLET | Refills: 0
Start: 2018-09-09

## 2018-09-09 RX ADMIN — OXYCODONE HYDROCHLORIDE AND ACETAMINOPHEN 1 TABLET: 10; 325 TABLET ORAL at 08:17

## 2018-09-09 RX ADMIN — NICOTINE 1 PATCH: 21 PATCH, EXTENDED RELEASE TRANSDERMAL at 08:15

## 2018-09-09 RX ADMIN — FAMOTIDINE 20 MG: 20 TABLET ORAL at 08:14

## 2018-09-09 RX ADMIN — OXYCODONE HYDROCHLORIDE AND ACETAMINOPHEN 1 TABLET: 10; 325 TABLET ORAL at 02:53

## 2018-09-09 NOTE — DISCHARGE SUMMARY
Patient Name: Marlen Balderrama  MRN: [id number]  : 1960  DOS: 2018    Attending: Rogelio Martinez MD    Primary Care Provider: Provider, No Known    Date of Admission:.2018  5:55 AM    Date of Discharge:  2018    Discharge Diagnosis: Principal Problem:    S/P lumbar fusion  Active Problems:    Back pain    Tobacco abuse    Prediabetes    Acute blood loss anemia, mild, asymptomatic    Acute postoperative pain      Hospital Course  Patient is a 58 y.o. female presented for lumbar fusion by Dr. Martinez under GA. She tolerated surgery well and was admitted for further medical management. Her back has been painful for several months with pain, numbness and tingling down the right leg. She denies saddle anesthesia.      Patient was provided pain medications as needed for pain control.    Adjustments were made to pain medications to optimize postop pain management. Risks and benefits of opiate medications discussed with patient.    She was seen by PT and has progressed well over her stay.  She used an IS for atelectasis prophylaxis and mechanicals for DVT prophylaxis.  Home medications were resumed as appropriate, and labs were monitored and remained fairly stable.     With the progress she has made, she is ready for DC home today.    Discussed with patient regarding plan and she shows understanding and agreement.    Patient will have HHPT following discharge.      Procedures Performed  PREOPERATIVE DIAGNOSES:   1. Lumbar spinal stenosis L3-L4 and L4-L5.   2. 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   3. Degenerative scoliosis L3-L4-L5.   4. Severe spondylosis/disc degeneration L3-L4 and L4-L5.      POSTOPERATIVE DIAGNOSES:   1. Lumbar spinal stenosis L3-L4 and L4-L5.   2. 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   3. Degenerative scoliosis L3-L4-L5.   4. Severe spondylosis/disc degeneration L3-L4 and L4-L5.      PROCEDURES PERFORMED:   1. Decompressive  laminectomy, medial facetectomy and foraminotomies to decompress neural elements at L3-L4 and L4-L5.   2. Anterior interbody fusion L3-L4 and L4-L5 with interbody fusion cage and bone graft.   3. Segmental instrumentation L3-L4-L5 with DePuy transpedicular fixation.   4. Posterolateral fusion L3-L4 and L4-L5 with bone graft.   5. Bone marrow aspiration through separate incision in the left posterior iliac crest.      SURGEON: Rogelio Martinez MD       Pertinent Test Results:    I reviewed the patient's new clinical results.     Results from last 7 days  Lab Units 18  0742 18  1331   WBC 10*3/mm3 10.73 7.91   HEMOGLOBIN g/dL 10.0* 14.6   HEMATOCRIT % 30.3* 44.6*   PLATELETS 10*3/mm3 202 300       Results from last 7 days  Lab Units 18  0535 18  1331   SODIUM mmol/L 136  --    POTASSIUM mmol/L 4.5  --    CHLORIDE mmol/L 104  --    CO2 mmol/L 27.0  --    BUN mg/dL 7*  --    CREATININE mg/dL 0.73  --    CALCIUM mg/dL 8.4*  --    GLUCOSE mg/dL 97 108*     Results for DAVID VILLALPANDO (MRN [id number]) as of 2018 10:45   Ref. Range 2018 12:01 2018 15:33 2018 20:33 2018 07:23   Glucose Latest Ref Range: 70 - 130 mg/dL 158 (H) 117 104 114     I reviewed the patient's new imaging including images and reports.      Physical therapy: Pt ambulated 300 feet with CGA and RW, limited by fatigue. Pt anticipates discharge home tomorrow, will continue to progress mobility as able.    Discharge Assessment:    Vital Signs  /58 (BP Location: Right arm, Patient Position: Lying)   Pulse 101   Temp 98.1 °F (36.7 °C) (Temporal Artery )   Resp 16   SpO2 95%   Temp (24hrs), Av.4 °F (36.9 °C), Min:98 °F (36.7 °C), Max:99 °F (37.2 °C)      General Appearance:    Alert, cooperative, in no acute distress   Lungs:     Clear to auscultation,respirations regular, even and                   unlabored    Heart:    Regular rhythm and normal rate, normal S1 and S2   Abdomen:     Normal bowel  sounds, no masses, no organomegaly, soft        non-tender, non-distended, no guarding, no rebound                 tenderness   Extremities:   Moves all extremities well, no edema, no cyanosis, no              redness   Pulses:   Pulses palpable and equal bilaterally   Skin:   No bleeding, bruising or rash. Back dressing CDI   Neurologic:   Cranial nerves 2 - 12 grossly intact, sensation intact. Flexion and dorsiflexion intact bilateral feet.       Discharge Disposition: Home    Discharge Medications     Discharge Medications      New Medications      Instructions Start Date   docusate sodium 100 MG capsule  Commonly known as:  COLACE   100 mg, Oral, 2 Times Daily      oxyCODONE-acetaminophen 7.5-325 MG per tablet  Commonly known as:  PERCOCET   1 tablet, Oral, Every 6 Hours PRN         Continue These Medications      Instructions Start Date   b complex-C-folic acid 1 MG capsule   1 capsule, Oral, Daily      calcium carbonate 600 MG tablet  Commonly known as:  OS-KORY   1,200 mg, Oral, Daily      cholecalciferol 1000 units tablet  Commonly known as:  VITAMIN D3   2,000 Units, Oral, Daily             Discharge Diet: Consistent carb diet    Activity at Discharge: ambulate    Follow-up Appointments  Dr. Martinez per his orders      DENY Hudson  09/09/18  10:47 AM

## 2018-09-09 NOTE — PLAN OF CARE
Problem: Patient Care Overview  Goal: Plan of Care Review  Outcome: Outcome(s) achieved Date Met: 09/09/18 09/09/18 1032   Plan of Care Review   Progress improving   OTHER   Outcome Summary Pt demonstrated improved independence with ADLS, functional mobility and adherance to spinal precautions. Pt made excellent progress with IPOT meeting all but one goal. Pt pending DC home with assist this date. DC OT, if DC does not occur, cont OT POC    Coping/Psychosocial   Plan of Care Reviewed With patient;spouse

## 2018-09-09 NOTE — PROGRESS NOTES
Case Management Discharge Note    Final Note: Per Isidro with PT, patient will need a rolling walker at discharge. Spoke with patient, and she does not have a preference for a DME company. Referral called to Rebecca with [employer], ph. [phone]. She will deliver the rolling walker to the patient's room this afternoon. Home health previously arranged with [facility] with instructions for patient to notify [facility] once she is home. No other discharged needs identified.    Destination     No service has been selected for the patient.      Durable Medical Equipment - Selection Complete     Service Request Status Selected Specialties Address Phone Number Fax Number    [facility] [place] Selected D[facility] Services [address] [address] [phone]      Dialysis/Infusion     No service has been selected for the patient.      Home Medical Care - Selection Complete     Service Request Status Selected Specialties Address Phone Number Fax Number    [facility] Selected Home Health Services [address] [facility] [address] [address] [phone]      Social Care     No service has been selected for the patient.             Final Discharge Disposition Code: 01 - home or self-care

## 2018-09-09 NOTE — THERAPY DISCHARGE NOTE
Acute Care - Occupational Therapy Treatment Note/Discharge  [facility]     Patient Name: Marlen Balderrama  : 1960  MRN: [id number]  Today's Date: 2018  Onset of Illness/Injury or Date of Surgery: 18  Date of Referral to OT: 18  Referring Physician: MD Gm      Admit Date: 2018    Visit Dx:     ICD-10-CM ICD-9-CM   1. Impaired functional mobility, balance, gait, and endurance Z74.09 V49.89   2. Impaired mobility and ADLs Z74.09 799.89     Patient Active Problem List   Diagnosis   • Back pain   • S/P lumbar fusion   • Tobacco abuse   • Prediabetes   • Acute blood loss anemia, mild, asymptomatic   • Acute postoperative pain       Therapy Treatment          Rehabilitation Treatment Summary     Row Name 1856             Treatment Time/Intention    Discipline occupational therapist  -      Document Type discharge treatment  -MC2      Subjective Information complains of;pain  -MC2      Mode of Treatment individual therapy;occupational therapy  -2      Patient/Family Observations Pt received in supine, IV heplocked,  present during beginning and end of tx session  -2      Care Plan Review care plan/treatment goals reviewed;risks/benefits reviewed;patient/other agree to care plan  -2      Care Plan Review, Other Participant(s) spouse  -MC2      Patient Effort excellent  -MC2      Existing Precautions/Restrictions fall;spinal  -MC2      Recorded by [MC] Kayce Bradford, OT 18 1018  [MC2] Kayce Bradford, OT 18 1031      Row Name 1856             Cognitive Assessment/Intervention    Additional Documentation Cognitive Assessment/Intervention (Group)  -MC      Recorded by [MC] Kayce Bradford, OT 18 1031      Row Name 1856             Cognitive Assessment/Intervention- PT/OT    Affect/Mental Status (Cognitive) WNL  -MC      Orientation Status (Cognition) oriented x 4  -MC      Follows Commands (Cognition) WFL  -      Cognitive Function  (Cognitive) WNL  -      Personal Safety Interventions fall prevention program maintained;gait belt;muscle strengthening facilitated;nonskid shoes/slippers when out of bed;supervised activity  -      Recorded by [MC] Sanchez Kayce KASSANDRA, OT 09/09/18 1031      Row Name 09/09/18 0956             Bed Mobility Assessment/Treatment    Bed Mobility Assessment/Treatment rolling right  -MC      Rolling Right [place] (Bed Mobility) supervision  -      Supine-Sit [place] (Bed Mobility) supervision  -      Sit-Supine [place] (Bed Mobility) not tested  -      Assistive Device (Bed Mobility) bed rails  -      Comment (Bed Mobility) Pt adhered to spinal precautions and log roll technique independently without cues from OT  -MC      Recorded by [MC] Kayce Bradford, OT 09/09/18 1031      Row Name 09/09/18 0956             Functional Mobility    Functional Mobility- Ind. Level supervision required  -      Functional Mobility- Device rolling walker  -      Functional Mobility- Comment Pt performed mobility to the bathroom and to the recliner  -      Recorded by [MC] Kayce Bradford, OT 09/09/18 1031      Row Name 09/09/18 0956             Transfer Assessment/Treatment    Transfer Assessment/Treatment sit-stand transfer;stand-sit transfer;bed-chair transfer;toilet transfer  -      Comment (Transfers) Pt demonstrated good safety awareness and adherance to spinal precautions during all transfers and mobility  -      Recorded by [MC] Kayce Bradford, OT 09/09/18 1031      Row Name 09/09/18 0956             Bed-Chair Transfer    Bed-Chair [place] (Transfers) stand by assist  -      Assistive Device (Bed-Chair Transfers) walker, front-wheeled  -      Recorded by [MC] Kayce Bradford, OT 09/09/18 1031      Row Name 09/09/18 0956             Sit-Stand Transfer    Sit-Stand [place] (Transfers) stand by assist  -      Assistive Device (Sit-Stand Transfers) walker, front-wheeled  -      Recorded by [MC] Sanchez  Kayce CANNON, OT 09/09/18 1031      Row Name 09/09/18 0956             Stand-Sit Transfer    Stand-Sit [place] (Transfers) stand by assist  -MC      Assistive Device (Stand-Sit Transfers) walker, front-wheeled  -MC      Recorded by [MC] Kayce Bradford, OT 09/09/18 1031      Row Name 09/09/18 0956             Toilet Transfer    Type (Toilet Transfer) sit-stand;stand-sit  -MC      [place] Level (Toilet Transfer) stand by assist  -MC      Assistive Device (Toilet Transfer) grab bars/safety frame;raised toilet seat;walker, front-wheeled  -MC      Recorded by [MC] Kayce Bradford, OT 09/09/18 1031      Row Name 09/09/18 0956             ADL Assessment/Intervention    BADL Assessment/Intervention toileting;grooming  -MC      Recorded by [MC] Kayce Bradford, OT 09/09/18 1031      Row Name 09/09/18 0956             Lower Body Dressing Assessment/Training    Lower Body Dressing [place] Level don;doff;socks;pants/bottoms;supervision  -MC      Assistive Devices (Lower Body Dressing) reacher  -MC      Comment (Lower Body Dressing) Pt demonstrated good adherance to spinal precautions while performing ADLS  -MC      Recorded by [MC] Kayce Bradford, OT 09/09/18 1031      Row Name 09/09/18 0956             Grooming Assessment/Training    [place] Level (Grooming) grooming skills;supervision  -MC      Grooming Position sink side;unsupported standing  -MC      Recorded by [MC] Kayce Bradford, OT 09/09/18 1031      Row Name 09/09/18 0956             Toileting Assessment/Training    [place] Level (Toileting) toileting skills;conditional independence  -MC      Assistive Devices (Toileting) grab bar/safety frame  -MC      Toileting Position supported sitting  -MC      Recorded by [MC] Kayce Bradford, OT 09/09/18 1031      Row Name 09/09/18 0956             Balance    Balance static sitting balance;static standing balance;dynamic sitting balance;dynamic standing balance  -MC      Recorded by [MC] Kayce Bradford, OT 09/09/18 1031       Row Name 09/09/18 0956             Static Sitting Balance    Level of [place] (Unsupported Sitting, Static Balance) independent  -      Sitting Position (Unsupported Sitting, Static Balance) sitting in chair  -      Recorded by [MC] Kayce Bradford, OT 09/09/18 1031      Row Name 09/09/18 0956             Dynamic Sitting Balance    Level of [place], Reaches Outside Midline (Sitting, Dynamic Balance) independent  -      Sitting Position, Reaches Outside Midline (Sitting, Dynamic Balance) sitting in chair  -MC      Recorded by [MC] Kayce Bradford, OT 09/09/18 1031      Row Name 09/09/18 0956             Static Standing Balance    Level of [place] (Supported Standing, Static Balance) supervision  -MC      Recorded by [MC] Kayce Bradford, OT 09/09/18 1031      Row Name 09/09/18 0956             Dynamic Standing Balance    Level of [place], Reaches Outside Midline (Standing, Dynamic Balance) standby assist  -MC      Recorded by [MC] Kayce Bradford, OT 09/09/18 1031      Row Name 09/09/18 0956             Positioning and Restraints    Pre-Treatment Position in bed  -      Post Treatment Position chair  -MC      In Chair notified nsg;reclined;call light within reach;encouraged to call for assist;with family/caregiver;legs elevated  -      Recorded by [MC] Kayce Bradford, OT 09/09/18 1031      Row Name 09/09/18 0956             Pain Scale: Numbers Pre/Post-Treatment    Pain Scale: Numbers, Pretreatment 7/10  -MC      Pain Scale: Numbers, Post-Treatment 7/10  -MC      Pain Location - Orientation lower  -      Pain Location back  -      Pain Intervention(s) Repositioned;Ambulation/increased activity  -      Recorded by [MC] Kayce Bradford, OT 09/09/18 1031      Row Name                Wound 09/06/18 1130 Other (See comments) back incision    Wound - Properties Group Date first assessed: 09/06/18 [TK] Time first assessed: 1130 [TK] Side: Other (See comments) [TK] Location: back [TK] Type: incision [TK]  Recorded by:  [TK] Ani Ramey RN 09/06/18 1130    Row Name 09/09/18 0956             Coping    Observed Emotional State calm;cooperative;pleasant  -MC      Verbalized Emotional State acceptance  -MC      Recorded by [MC] Kayce Bradford, OT 09/09/18 1031      Row Name 09/09/18 0956             Plan of Care Review    Plan of Care Reviewed With patient;spouse  -MC      Recorded by [MC] Kayce Bradford, OT 09/09/18 1031      Row Name 09/09/18 0956             Outcome Summary/Treatment Plan (OT)    Daily Summary of Progress (OT) progress toward functional goals as expected  -MC      Recorded by [MC] Kayce Bradford, OT 09/09/18 1031        User Key  (r) = Recorded By, (t) = Taken By, (c) = Cosigned By    Initials Name Effective Dates Discipline    TK Ani Ramey RN 06/16/16 -  Nurse    MC Kayce Bradford, OT 03/14/16 -  OT        Wound 09/06/18 1130 Other (See comments) back incision (Active)   Dressing Appearance dry;intact 9/9/2018  7:45 AM   Drainage Amount none 9/9/2018  7:45 AM   Dressing Care, Wound other (see comments) 9/8/2018  8:43 PM             OT Rehab Goals     Row Name 09/09/18 0956             Bed Mobility Goal 1 (OT)    Activity/Assistive Device (Bed Mobility Goal 1, OT) bed mobility activities, all   log roll in and out of bed without AD  -MC      [place] Level/Cues Needed (Bed Mobility Goal 1, OT) independent  -MC      Time Frame (Bed Mobility Goal 1, OT) long term goal (LTG);3 days  -MC      Progress/Outcomes (Bed Mobility Goal 1, OT) goal ongoing  -MC         Transfer Goal 1 (OT)    Activity/Assistive Device (Transfer Goal 1, OT) sit-to-stand/stand-to-sit;toilet;tub;walker, rolling  -MC      [place] Level/Cues Needed (Transfer Goal 1, OT) supervision required;verbal cues required  -MC      Time Frame (Transfer Goal 1, OT) long term goal (LTG);3 days  -MC      Progress/Outcome (Transfer Goal 1, OT) goal partially met;other (see comments)   met all but tub  -MC         Dressing Goal 1 (OT)     Activity/Assistive Device (Dressing Goal 1, OT) lower body dressing   AE/adapted technique  -      [place]/Cues Needed (Dressing Goal 1, OT) supervision required;set-up required;verbal cues required  -      Time Frame (Dressing Goal 1, OT) long term goal (LTG);3 days  -MC      Progress/Outcome (Dressing Goal 1, OT) goal met  -         Toileting Goal 1 (OT)    Activity/Device (Toileting Goal 1, OT) perform perineal hygiene   simulate  -      [place] Level/Cues Needed (Toileting Goal 1, OT) independent  -MC      Time Frame (Toileting Goal 1, OT) long term goal (LTG);3 days  -MC      Progress/Outcome (Toileting Goal 1, OT) goal met  -         Patient Education Goal (OT)    Activity (Patient Education Goal, OT) Pt. verbalize and demonstrate use of spinal precuations with ADL task and trasnfers  -      [place]/Cues/Accuracy (Memory Goal 2, OT) verbalizes understanding;demonstrates adequately  -      Time Frame (Patient Education Goal, OT) long term goal (LTG);3 days  -      Progress/Outcome (Patient Education Goal, OT) goal met  -        User Key  (r) = Recorded By, (t) = Taken By, (c) = Cosigned By    Initials Name Provider Type Discipline    Kayce Black, OT Occupational Therapist OT          Occupational Therapy Education     Title: PT OT SLP Therapies (Done)     Topic: Occupational Therapy (Done)     Point: ADL training (Done)     Description: Instruct learner(s) on proper safety adaptation and remediation techniques during self care or transfers.   Instruct in proper use of assistive devices.   Learning Progress Summary     Learner Status Readiness Method Response Comment Documented by    Patient Done SUSSY Cheatham D, H VU, DU MC 09/09/18 1031     Done Acceptance TOBI VELARDE H VU,NR role OT, reason for consult, AE use, adapted technique to maintain spinal prec., log roll, spinal prec., AD available, car transfers, posture. EDGARDO 09/07/18 0919    Family Done SUSSY Cheatham D, H VU, DU MC 09/09/18 1031           Point: Home exercise program (Done)     Description: Instruct learner(s) on appropriate technique for monitoring, assisting and/or progressing therapeutic exercises/activities.   Learning Progress Summary     Learner Status Readiness Method Response Comment Documented by    Patient Done Acceptance E,D,H VU,NR role OT, reason for consult, AE use, adapted technique to maintain spinal prec., log roll, spinal prec., AD available, car transfers, posture.  09/07/18 0919          Point: Precautions (Done)     Description: Instruct learner(s) on prescribed precautions during self-care and functional transfers.   Learning Progress Summary     Learner Status Readiness Method Response Comment Documented by    Patient Done Antonella VELARDE,TB,D,H VUYUNG   09/09/18 1031     Done Acceptance E,D,H VU,NR role OT, reason for consult, AE use, adapted technique to maintain spinal prec., log roll, spinal prec., AD available, car transfers, posture.  09/07/18 0919    [facility]18 1031          Point: Body mechanics (Done)     Description: Instruct learner(s) on proper positioning and spine alignment during self-care, functional mobility activities and/or exercises.   Learning Progress Summary     Learner Status Readiness Method Response Comment Documented by    Patient Done Antonella VELARDETB,D,H YUNG RICE   09/09/18 1031     Done Acceptance E,D,H VU,NR role OT, reason for consult, AE use, adapted technique to maintain spinal prec., log roll, spinal prec., AD available, car transfers, posture.  09/07/18 0919    Family Done SUSSY Cheatham DH YUNG RICE   09/09/18 1031                      User Key     Initials Effective Dates Name Provider Type Discipline     06/08/18 -  Indira Jade, OT Occupational Therapist OT     03/14/16 -  Kayce Bradford, OT Occupational Therapist OT                OT Recommendation and Plan  Outcome Summary/Treatment Plan (OT)  Daily Summary of Progress (OT): progress toward functional  goals as expected  Anticipated Discharge Disposition (OT): home with assist  Daily Summary of Progress (OT): progress toward functional goals as expected  Plan of Care Review  Plan of Care Reviewed With: patient, spouse  Plan of Care Reviewed With: patient, spouse  Outcome Summary: Pt demonstrated improved independence with ADLS, functional mobility and adherance to spinal precautions.  Pt made excellent progress with IPOT meeting all but one goal.  Pt pending DC home with assist this date.  DC OT, if DC does not occur, cont OT POC           Outcome Measures     Row Name 09/09/18 0956 09/08/18 1359 09/07/18 1124       How much help from another person do you currently need...    Turning from your back to your side while in flat bed without using bedrails?  -- 3  -MJ 3  -MJ    Moving from lying on back to sitting on the side of a flat bed without bedrails?  -- 3  -MJ 3  -MJ    Moving to and from a bed to a chair (including a wheelchair)?  -- 3  -MJ 3  -MJ    Standing up from a chair using your arms (e.g., wheelchair, bedside chair)?  -- 3  -MJ 3  -MJ    Climbing 3-5 steps with a railing?  -- 3  -MJ 3  -MJ    To walk in hospital room?  -- 3  -MJ 3  -MJ    AM-PAC 6 Clicks Score  -- 18  -MJ 18  -MJ       How much help from another is currently needed...    Putting on and taking off regular lower body clothing? 3  -MC  --  --    Bathing (including washing, rinsing, and drying) 3  -MC  --  --    Toileting (which includes using toilet bed pan or urinal) 3  -MC  --  --    Putting on and taking off regular upper body clothing 4  -MC  --  --    Taking care of personal grooming (such as brushing teeth) 4  -MC  --  --    Eating meals 4  -MC  --  --    Score 21  -MC  --  --       Functional Assessment    Outcome Measure Options AM-PAC 6 Clicks Daily Activity (OT)  -MC AM-PAC 6 Clicks Basic Mobility (PT)  -MJ AM-PAC 6 Clicks Basic Mobility (PT)  -MJ    Row Name 09/07/18 0814 09/06/18 1538          How much help from another  person do you currently need...    Turning from your back to your side while in flat bed without using bedrails?  -- 3  -LR     Moving from lying on back to sitting on the side of a flat bed without bedrails?  -- 3  -LR     Moving to and from a bed to a chair (including a wheelchair)?  -- 3  -LR     Standing up from a chair using your arms (e.g., wheelchair, bedside chair)?  -- 3  -LR     Climbing 3-5 steps with a railing?  -- 3  -LR     To walk in hospital room?  -- 3  -LR     AM-PAC 6 Clicks Score  -- 18  -LR        How much help from another is currently needed...    Putting on and taking off regular lower body clothing? 3  -EDGARDO  --     Bathing (including washing, rinsing, and drying) 3  -EDGARDO  --     Toileting (which includes using toilet bed pan or urinal) 3  -EDGARDO  --     Putting on and taking off regular upper body clothing 3  -EDGARDO  --     Taking care of personal grooming (such as brushing teeth) 4  -EDGARDO  --     Eating meals 4  -EDGARDO  --     Score 20  -EDGARDO  --        Functional Assessment    Outcome Measure Options AM-PAC 6 Clicks Daily Activity (OT)  -EDGARDO AM-PAC 6 Clicks Basic Mobility (PT)  -LR       User Key  (r) = Recorded By, (t) = Taken By, (c) = Cosigned By    Initials Name Provider Type    Indira Jonas, OT Occupational Therapist    LR Karin Correia, PT Physical Therapist    Kayce Black, OT Occupational Therapist    Isidro Manning, PT Physical Therapist           Time Calculation:          Time Calculation- OT     Row Name 09/09/18 0956             Time Calculation- OT    OT Start Time 0956  -      Total Timed Code Minutes- OT 19 minute(s)  -      OT Received On 09/09/18  -      OT Goal Re-Cert Due Date 09/17/18  -         Timed Charges    66289 - OT Self Care/Mgmt Minutes 17  -        User Key  (r) = Recorded By, (t) = Taken By, (c) = Cosigned By    Initials Name Provider Type    Kayce Black, OT Occupational Therapist        Therapy Suggested Charges     Code   Minutes Charges     85543 (CPT®) Hc Ot Neuromusc Re Education Ea 15 Min      46863 (CPT®) Hc Ot Ther Proc Ea 15 Min      29779 (CPT®) Hc Ot Therapeutic Act Ea 15 Min      34822 (CPT®) Hc Ot Manual Therapy Ea 15 Min      72996 (CPT®) Hc Ot Iontophoresis Ea 15 Min      07212 (CPT®) Hc Ot Elec Stim Ea-Per 15 Min      90664 (CPT®) Hc Ot Ultrasound Ea 15 Min      88605 (CPT®) Hc Ot Self Care/Mgmt/Train Ea 15 Min 17 1    Total  17 1        Therapy Charges for Today     Code Description Service Date Service Provider Modifiers Qty    [id number] HC OT SELF CARE/MGMT/TRAIN EA 15 MIN 9/9/2018 Kayce Bradford, OT GO 1               OT Discharge Summary  Anticipated Discharge Disposition (OT): home with assist  Reason for Discharge: Discharge from facility  Outcomes Achieved: Refer to plan of care for updates on goals achieved  Discharge Destination: Home with assist    Kayce Bradford OT  9/9/2018

## 2018-09-09 NOTE — PLAN OF CARE
Problem: Patient Care Overview  Goal: Plan of Care Review  Outcome: Outcome(s) achieved Date Met: 09/09/18 09/09/18 1140   Plan of Care Review   Progress improving   OTHER   Outcome Summary Pt increased gait distance to 600 feet with supervision and RW. Pt progressed to stair training and required less assist for all functional mobility. Pt is discharging home today with assist, will need RW prior to discharge, made good progress toward goals during inpatient stay   Coping/Psychosocial   Plan of Care Reviewed With patient       Problem: Laminectomy/Foraminotomy/Discectomy (Adult)  Goal: Signs and Symptoms of Listed Potential Problems Will be Absent, Minimized or Managed (Laminectomy/Foraminotomy/Discectomy)  Outcome: Outcome(s) achieved Date Met: 09/09/18 09/09/18 1140   Goal/Outcome Evaluation   Problems Assessed (Laminectomy/Laminotomy/Discectomy) functional decline/self-care deficit;pain   Problems Present (Laminectomy/otomy) functional decline/self-care deficit;pain

## 2018-09-09 NOTE — PLAN OF CARE
Problem: Patient Care Overview  Goal: Plan of Care Review  Outcome: Ongoing (interventions implemented as appropriate)   09/09/18 0537   Plan of Care Review   Progress improving   OTHER   Outcome Summary Patients pain has been well controlled with PRN pain medications. Coverderm C/D/I , patient ambulates with x1 assist walker and gait belt. Plan is for patient to D/C home today.    Coping/Psychosocial   Plan of Care Reviewed With patient;spouse       Problem: Laminectomy/Foraminotomy/Discectomy (Adult)  Goal: Signs and Symptoms of Listed Potential Problems Will be Absent, Minimized or Managed (Laminectomy/Foraminotomy/Discectomy)  Outcome: Ongoing (interventions implemented as appropriate)   09/09/18 0537   Goal/Outcome Evaluation   Problems Assessed (Laminectomy/Laminotomy/Discectomy) all   Problems Present (Laminectomy/otomy) functional decline/self-care deficit;pain       Problem: Pain, Acute (Adult)  Goal: Identify Related Risk Factors and Signs and Symptoms  Outcome: Ongoing (interventions implemented as appropriate)   09/09/18 0537   Pain, Acute (Adult)   Related Risk Factors (Acute Pain) surgery   Signs and Symptoms (Acute Pain) verbalization of pain descriptors     Goal: Acceptable Pain Control/Comfort Level  Outcome: Ongoing (interventions implemented as appropriate)   09/09/18 0537   Pain, Acute (Adult)   Acceptable Pain Control/Comfort Level making progress toward outcome

## 2018-09-09 NOTE — THERAPY DISCHARGE NOTE
Acute Care - Physical Therapy Treatment Note/Discharge  [facility]     Patient Name: Marlen Balderrama  : 1960  MRN: [id number]  Today's Date: 2018  Onset of Illness/Injury or Date of Surgery: 18  Date of Referral to PT: 18  Referring Physician: MD Gm    Admit Date: 2018    Visit Dx:    ICD-10-CM ICD-9-CM   1. Impaired functional mobility, balance, gait, and endurance Z74.09 V49.89   2. Impaired mobility and ADLs Z74.09 799.89     Patient Active Problem List   Diagnosis   • Back pain   • S/P lumbar fusion   • Tobacco abuse   • Prediabetes   • Acute blood loss anemia, mild, asymptomatic   • Acute postoperative pain       Physical Therapy Education     Title: PT OT SLP Therapies (Done)     Topic: Physical Therapy (Done)     Point: Mobility training (Done)    Learning Progress Summary     Learner Status Readiness Method Response Comment Documented by    Patient Done Acceptance TOBI VELARDE Reviewed back precautions, HEP, gait mechanics, stairs sequencing, car transfer, benefits of mobility  18 1140     Done Acceptance TOBI VELARDE,NR Reviewed back precautions, log roll, gait mechanics  18 1431     Done Acceptance TOBI VELARDE,NR Reviewed back precautions, log roll, HEP, gait mechanics, benefits of mobility  18 1156     Done Acceptance TOBI VELARDE,RENA RICE,BASILIA Issued and reviewed handout regarding spinal precautions. Educated on spinal precautions, correct log rolling technique, correct gait mechanics, benefits of frequent ambulation, and progression of POC.  18 1627    Significant Other Done Acceptance TOBI VELARDE Reviewed back precautions, HEP, gait mechanics, stairs sequencing, car transfer, benefits of mobility  18 1140          Point: Home exercise program (Done)    Learning Progress Summary     Learner Status Readiness Method Response Comment Documented by    Patient Done Acceptance TOBI VELARDE Reviewed back precautions, HEP, gait mechanics, stairs sequencing, car transfer,  benefits of mobility MJ 09/09/18 1140     Done Acceptance E,D KRYSTAL,NR Reviewed back precautions, log roll, gait mechanics  09/08/18 1431     Done Acceptance E,D KRYSTAL,NR Reviewed back precautions, log roll, HEP, gait mechanics, benefits of mobility MJ 09/07/18 1156     Done Acceptance E,D,H KRYSTAL,NR Issued and reviewed handout regarding spinal precautions. Educated on spinal precautions, correct log rolling technique, correct gait mechanics, benefits of frequent ambulation, and progression of POC. LR 09/06/18 1627    Significant Other Done Acceptance E,D VU Reviewed back precautions, HEP, gait mechanics, stairs sequencing, car transfer, benefits of mobility MJ 09/09/18 1140          Point: Body mechanics (Done)    Learning Progress Summary     Learner Status Readiness Method Response Comment Documented by    Patient Done Acceptance E,D VU Reviewed back precautions, HEP, gait mechanics, stairs sequencing, car transfer, benefits of mobility MJ 09/09/18 1140     Done Acceptance ETOBI,NR Reviewed back precautions, log roll, gait mechanics  09/08/18 1431     Done Acceptance ETOBI,NR Reviewed back precautions, log roll, HEP, gait mechanics, benefits of mobility  09/07/18 1156     Done Acceptance E,D,H KRYSTAL,NR Issued and reviewed handout regarding spinal precautions. Educated on spinal precautions, correct log rolling technique, correct gait mechanics, benefits of frequent ambulation, and progression of POC. LR 09/06/18 1627    Significant Other Done Acceptance E,D VU Reviewed back precautions, HEP, gait mechanics, stairs sequencing, car transfer, benefits of mobility MJ 09/09/18 1140          Point: Precautions (Done)    Learning Progress Summary     Learner Status Readiness Method Response Comment Documented by    Patient Done Acceptance E,D VU Reviewed back precautions, HEP, gait mechanics, stairs sequencing, car transfer, benefits of mobility MJ 09/09/18 1140     Done Acceptance ETOBI,NR Reviewed back precautions, log  roll, gait mechanics  09/08/18 1431     Done Acceptance TOBI VELARDE NR Reviewed back precautions, log roll, HEP, gait mechanics, benefits of mobility  09/07/18 1156     Done Acceptance TOBI VELARDE,H KRYSTAL,BASILIA Issued and reviewed handout regarding spinal precautions. Educated on spinal precautions, correct log rolling technique, correct gait mechanics, benefits of frequent ambulation, and progression of POC.  09/06/18 1627    Significant Other Done Acceptance TOBI VELARDE Reviewed back precautions, HEP, gait mechanics, stairs sequencing, car transfer, benefits of mobility  09/09/18 1140                      User Key     Initials Effective Dates Name Provider Type Discipline     06/19/15 -  Karin Correia, PT Physical Therapist PT     04/03/18 -  Isidro Olvera, PT Physical Therapist PT                    PT Rehab Goals     Row Name 09/09/18 1105             Bed Mobility Goal 1 (PT)    Activity/Assistive Device (Bed Mobility Goal 1, PT) sit to supine/supine to sit  -MJ      [place] Level/Cues Needed (Bed Mobility Goal 1, PT) conditional independence  -MJ      Time Frame (Bed Mobility Goal 1, PT) long term goal (LTG);3 days  -MJ      Progress/Outcomes (Bed Mobility Goal 1, PT) goal not met;discharged from facility  -MJ         Transfer Goal 1 (PT)    Activity/Assistive Device (Transfer Goal 1, PT) sit-to-stand/stand-to-sit;walker, rolling  -MJ      [place] Level/Cues Needed (Transfer Goal 1, PT) conditional independence  -MJ      Time Frame (Transfer Goal 1, PT) long term goal (LTG);3 days  -MJ      Progress/Outcome (Transfer Goal 1, PT) goal not met;discharged from facility  -MJ         Gait Training Goal 1 (PT)    Activity/Assistive Device (Gait Training Goal 1, PT) gait (walking locomotion);walker, rolling  -MJ      [place] Level (Gait Training Goal 1, PT) conditional independence  -MJ      Distance (Gait Goal 1, PT) 500 feet  -MJ      Time Frame (Gait Training Goal 1, PT) long term goal (LTG);3 days  -MJ       Progress/Outcome (Gait Training Goal 1, PT) goal partially met   achieved distance  -         Stairs Goal 1 (PT)    Activity/Assistive Device (Stairs Goal 1, PT) ascending stairs;descending stairs;step-to-step;walker, rolling;other (see comments)   backwards  -      [place] Level/Cues Needed (Stairs Goal 1, PT) contact guard assist  -      Number of Stairs (Stairs Goal 1, PT) 1  -MJ      Time Frame (Stairs Goal 1, PT) long term goal (LTG);3 days  -      Progress/Outcome (Stairs Goal 1, PT) goal met  -        User Key  (r) = Recorded By, (t) = Taken By, (c) = Cosigned By    Initials Name Provider Type Discipline     Isidro Olvera, PT Physical Therapist PT        Therapy Treatment        Rehabilitation Treatment Summary     Row Name 09/09/18 1105 09/09/18 0956          Treatment Time/Intention    Discipline physical therapist  - occupational therapist  -     Document Type therapy note (daily note);discharge treatment  - discharge treatment  -2     Subjective Information no complaints  - complains of;pain  -2     Mode of Treatment physical therapy  - individual therapy;occupational therapy  -2     Patient/Family Observations Pt sitting UIC.  present  - Pt received in supine, IV heplocked,  present during beginning and end of tx session  -2     Care Plan Review patient/other agree to care plan  - care plan/treatment goals reviewed;risks/benefits reviewed;patient/other agree to care plan  -2     Care Plan Review, Other Participant(s)  -- spouse  -MC2     Patient Effort excellent  - excellent  -MC2     Existing Precautions/Restrictions fall;spinal  - fall;spinal  -MC2     Recorded by [MJ] Isidro Olvera, PT 09/09/18 1139 [MC] Kayce Bradford, OT 09/09/18 1018  [MC2] Kayce Bradford, OT 09/09/18 1031     Row Name 09/09/18 0956             Cognitive Assessment/Intervention    Additional Documentation Cognitive Assessment/Intervention (Group)  -      Recorded by [MC]  Kayce Bradford KASSANDRA, OT 09/09/18 1031      Row Name 09/09/18 1105 09/09/18 0956          Cognitive Assessment/Intervention- PT/OT    Affect/Mental Status (Cognitive) WNL  -MJ WNL  -     Orientation Status (Cognition) oriented x 4  -MJ oriented x 4  -MC     Follows Commands (Cognition) WNL  - WFL  -     Cognitive Function (Cognitive)  -- WNL  -     Personal Safety Interventions  -- fall prevention program maintained;gait belt;muscle strengthening facilitated;nonskid shoes/slippers when out of bed;supervised activity  -     Recorded by [MJ] Isidro Olvera, PT 09/09/18 1139 [MC] SanchezKayce KASSANDRA, OT 09/09/18 1031     Row Name 09/09/18 1105             Safety Issues, Functional Mobility    Impairments Affecting Function (Mobility) pain;strength  -      Recorded by [MJ] Isidro Olvera, PT 09/09/18 1139      Row Name 09/09/18 1105 09/09/18 0956          Bed Mobility Assessment/Treatment    Bed Mobility Assessment/Treatment  -- rolling right  -     Rolling Right [place] (Bed Mobility)  -- supervision  -     Supine-Sit [place] (Bed Mobility)  -- supervision  -     Sit-Supine [place] (Bed Mobility)  -- not tested  -     Assistive Device (Bed Mobility)  -- bed rails  -     Comment (Bed Mobility) NT- pt UI  - Pt adhered to spinal precautions and log roll technique independently without cues from OT  -MC     Recorded by [MJ] Isidro Olvera, PT 09/09/18 1139 [MC] SanchezKayce KASSANDRA, OT 09/09/18 1031     Row Name 09/09/18 0956             Functional Mobility    Functional Mobility- Ind. Level supervision required  -      Functional Mobility- Device rolling walker  -      Functional Mobility- Comment Pt performed mobility to the bathroom and to the recliner  -      Recorded by [MC] SanchezKayce KASSANDRA, OT 09/09/18 1031      Row Name 09/09/18 1105 09/09/18 0956          Transfer Assessment/Treatment    Transfer Assessment/Treatment sit-stand transfer;stand-sit transfer  - sit-stand transfer;stand-sit  transfer;bed-chair transfer;toilet transfer  -     Comment (Transfers) Verbal cues for correct hand placement  - Pt demonstrated good safety awareness and adherance to spinal precautions during all transfers and mobility  -     Recorded by [MJ] Isidro Olvera, PT 09/09/18 1139 [MC] Kayce Bradford, OT 09/09/18 1031     Row Name 09/09/18 0956             Bed-Chair Transfer    Bed-Chair [place] (Transfers) stand by assist  -      Assistive Device (Bed-Chair Transfers) walker, front-wheeled  -      Recorded by [MC] Kayce Bradford, OT 09/09/18 1031      Row Name 09/09/18 1105 09/09/18 0956          Sit-Stand Transfer    Sit-Stand [place] (Transfers) supervision;verbal cues  - stand by assist  -     Assistive Device (Sit-Stand Transfers) walker, front-wheeled  -MJ walker, front-wheeled  -MC     Recorded by [MJ] Isidro Olvera, PT 09/09/18 1139 [MC] Kayce Bradford, OT 09/09/18 1031     Row Name 09/09/18 1105 09/09/18 0956          Stand-Sit Transfer    Stand-Sit [place] (Transfers) supervision;verbal cues  - stand by assist  -     Assistive Device (Stand-Sit Transfers) walker, front-wheeled  -MJ walker, front-wheeled  -MC     Recorded by [MJ] Isidro Olvera, PT 09/09/18 1139 [MC] Kayce Bradford, OT 09/09/18 1031     Row Name 09/09/18 0956             Toilet Transfer    Type (Toilet Transfer) sit-stand;stand-sit  -      [place] Level (Toilet Transfer) stand by assist  -      Assistive Device (Toilet Transfer) grab bars/safety frame;raised toilet seat;walker, front-wheeled  -      Recorded by [MC] Kayce Bradford, OT 09/09/18 1031      Row Name 09/09/18 1105             Gait/Stairs Assessment/Training    18464 - Gait Training Minutes  16  -MJ      [place] Level (Gait) supervision;verbal cues  -      Assistive Device (Gait) walker, front-wheeled  -      Distance in Feet (Gait) 600  -MJ      Pattern (Gait) step-through  -MJ      Deviations/Abnormal Patterns (Gait) bilateral  deviations;shawn decreased;stride length decreased  -MJ      Bilateral Gait Deviations forward flexed posture;heel strike decreased  -MJ      [place] Level (Stairs) contact guard;verbal cues  -MJ      Handrail Location (Stairs) left side (ascending)  -MJ      Number of Steps (Stairs) 3  -MJ      Ascending Technique (Stairs) step-to-step  -MJ      Descending Technique (Stairs) step-to-step  -MJ      Stairs, Safety Issues sequencing ability decreased;weight-shifting ability decreased  -MJ      Stairs, Impairments ROM decreased;strength decreased;pain  -      Comment (Gait/Stairs) Pt demo step through gait pattern at slow pace. Verbal cues for upright posture and to stay in middle of RW. Cues to increase LE weight bearing. Improvement noted with cues for correction. Pt performe stairs non-reciprocally. Cues to ascend with stronger leg and to descend with weaker leg  -MJ      Recorded by [MJ] Isidro Olvera, PT 09/09/18 1139      Row Name 09/09/18 0956             ADL Assessment/Intervention    BADL Assessment/Intervention toileting;grooming  -MC      Recorded by [MC] Kayce Bradford, OT 09/09/18 1031      Row Name 09/09/18 0956             Lower Body Dressing Assessment/Training    Lower Body Dressing [place] Level don;doff;socks;pants/bottoms;supervision  -MC      Assistive Devices (Lower Body Dressing) reacher  -      Comment (Lower Body Dressing) Pt demonstrated good adherance to spinal precautions while performing ADLS  -MC      Recorded by [MC] Kayce Bradford, OT 09/09/18 1031      Row Name 09/09/18 0956             Grooming Assessment/Training    [place] Level (Grooming) grooming skills;supervision  -MC      Grooming Position sink side;unsupported standing  -MC      Recorded by [MC] Kayce Bradford, OT 09/09/18 1031      Row Name 09/09/18 0956             Toileting Assessment/Training    [place] Level (Toileting) toileting skills;conditional independence  -MC      Assistive Devices (Toileting)  grab bar/safety frame  -      Toileting Position supported sitting  -MC      Recorded by [MC] Kayce Bradford, OT 09/09/18 1031      Row Name 09/09/18 1105             Therapeutic Exercise    02553 - PT Therapeutic Exercise Minutes 8  -MJ      Recorded by [MJ] Isidro Olvera, PT 09/09/18 1139      Row Name 09/09/18 1105             Therapeutic Exercise    Lower Extremity (Therapeutic Exercise) gluteal sets;heel slides, bilateral;quad sets, bilateral  -MJ      Lower Extremity Range of Motion (Therapeutic Exercise) hip internal/external rotation, bilateral;ankle dorsiflexion/plantar flexion, bilateral  -MJ      Core Strength (Therapeutic Exercise) abdominal bracing   bent knee fallout, arms overhead  -MJ      Exercise Type (Therapeutic Exercise) AROM (active range of motion);isometric contraction, static  -MJ      Position (Therapeutic Exercise) seated  -MJ      Sets/Reps (Therapeutic Exercise) 10x each  -MJ      Comment (Therapeutic Exercise) Back protocol: cues for technique  -MJ      Recorded by [MJ] Isidro Olvera, PT 09/09/18 1139      Row Name 09/09/18 0956             Balance    Balance static sitting balance;static standing balance;dynamic sitting balance;dynamic standing balance  -      Recorded by [MC] Kayce Bradford, OT 09/09/18 1031      Row Name 09/09/18 0956             Static Sitting Balance    Level of [place] (Unsupported Sitting, Static Balance) independent  -      Sitting Position (Unsupported Sitting, Static Balance) sitting in chair  -      Recorded by [MC] Kayce Bradford, OT 09/09/18 1031      Row Name 09/09/18 0956             Dynamic Sitting Balance    Level of [place], Reaches Outside Midline (Sitting, Dynamic Balance) independent  -      Sitting Position, Reaches Outside Midline (Sitting, Dynamic Balance) sitting in chair  -      Recorded by [MC] Kayce Bradford, OT 09/09/18 1031      Row Name 09/09/18 0956             Static Standing Balance    Level of [place] (Supported  Standing, Static Balance) supervision  -MC      Recorded by [MC] SanchezKayce KASSANDRA, OT 09/09/18 1031      Row Name 09/09/18 0956             Dynamic Standing Balance    Level of [place], Reaches Outside Midline (Standing, Dynamic Balance) standby assist  -MC      Recorded by [MC] SanchezKayce KASSANDRA, OT 09/09/18 1031      Row Name 09/09/18 1105 09/09/18 0956          Positioning and Restraints    Pre-Treatment Position sitting in chair/recliner  -MJ in bed  -MC     Post Treatment Position chair  -MJ chair  -MC     In Chair notified nsg;reclined;call light within reach;encouraged to call for assist;with family/caregiver  -MJ notified nsg;reclined;call light within reach;encouraged to call for assist;with family/caregiver;legs elevated  -MC     Recorded by [MJ] Isidro Olvera, PT 09/09/18 1139 [MC] SanchezKayce KASSANDRA, OT 09/09/18 1031     Row Name 09/09/18 1105 09/09/18 0956          Pain Scale: Numbers Pre/Post-Treatment    Pain Scale: Numbers, Pretreatment 2/10  -MJ 7/10  -MC     Pain Scale: Numbers, Post-Treatment 4/10  -MJ 7/10  -MC     Pain Location - Orientation lower  -MJ lower  -     Pain Location back  -MJ back  -     Pain Intervention(s) Repositioned;Ambulation/increased activity  -MJ Repositioned;Ambulation/increased activity  -MC     Recorded by [MJ] Isidro Olvera, PT 09/09/18 1139 [MC] SanchezKayce KASSANDRA, OT 09/09/18 1031     Row Name                Wound 09/06/18 1130 Other (See comments) back incision    Wound - Properties Group Date first assessed: 09/06/18 [TK] Time first assessed: 1130 [TK] Side: Other (See comments) [TK] Location: back [TK] Type: incision [TK] Recorded by:  [TK] Ani Ramey RN 09/06/18 1130    Row Name 09/09/18 0956             Coping    Observed Emotional State calm;cooperative;pleasant  -MC      Verbalized Emotional State acceptance  -MC      Recorded by [MC] Kayce Bradford, OT 09/09/18 1031      Row Name 09/09/18 1105 09/09/18 0956          Plan of Care Review    Plan of Care Reviewed With  patient;spouse  -MJ patient;spouse  -MC     Recorded by [MJ] Isidro Olvera, PT 09/09/18 1139 [MC] Kayce Bradford, OT 09/09/18 1031     Row Name 09/09/18 0956             Outcome Summary/Treatment Plan (OT)    Daily Summary of Progress (OT) progress toward functional goals as expected  -MC      Recorded by [MC] SanchezKayce KASSANDRA, OT 09/09/18 1031      Row Name 09/09/18 1105             Outcome Summary/Treatment Plan (PT)    Daily Summary of Progress (PT) progress toward functional goals is good  -MJ      Recorded by [MJ] Isidro Olvera, PT 09/09/18 1139        User Key  (r) = Recorded By, (t) = Taken By, (c) = Cosigned By    Initials Name Effective Dates Discipline    Ani Donnelly RN 06/16/16 -  Nurse    RIA BradfordKayce KASSANDRA, OT 03/14/16 -  OT    Isidro Manning, PT 04/03/18 -  PT        Wound 09/06/18 1130 Other (See comments) back incision (Active)   Dressing Appearance dry;intact 9/9/2018  7:45 AM   Drainage Amount none 9/9/2018  7:45 AM   Dressing Care, Wound other (see comments) 9/8/2018  8:43 PM       PT Recommendation and Plan     Outcome Summary/Treatment Plan (PT)  Daily Summary of Progress (PT): progress toward functional goals is good  Plan of Care Reviewed With: patient  Progress: improving  Outcome Summary: Pt increased gait distance to 600 feet with supervision and RW. Pt progressed to stair training and required less assist for all functional mobility. Pt is discharging home today with assist, will need RW prior to discharge, made good progress toward goals during inpatient stay          Outcome Measures     Row Name 09/09/18 1105 09/09/18 0956 09/08/18 1359       How much help from another person do you currently need...    Turning from your back to your side while in flat bed without using bedrails? 3  -MJ  -- 3  -MJ    Moving from lying on back to sitting on the side of a flat bed without bedrails? 3  -MJ  -- 3  -MJ    Moving to and from a bed to a chair (including a wheelchair)? 3  -MJ  -- 3  -MJ    Standing  up from a chair using your arms (e.g., wheelchair, bedside chair)? 3  -MJ  -- 3  -MJ    Climbing 3-5 steps with a railing? 3  -MJ  -- 3  -MJ    To walk in hospital room? 3  -MJ  -- 3  -MJ    AM-PAC 6 Clicks Score 18  -MJ  -- 18  -MJ       How much help from another is currently needed...    Putting on and taking off regular lower body clothing?  -- 3  -MC  --    Bathing (including washing, rinsing, and drying)  -- 3  -MC  --    Toileting (which includes using toilet bed pan or urinal)  -- 3  -MC  --    Putting on and taking off regular upper body clothing  -- 4  -MC  --    Taking care of personal grooming (such as brushing teeth)  -- 4  -MC  --    Eating meals  -- 4  -MC  --    Score  -- 21  -MC  --       Functional Assessment    Outcome Measure Options -[facility] 6 Clicks Basic Mobility (PT)  -[facility] 6 Clicks Daily Activity (OT)  -[facility] 6 Clicks Basic Mobility (PT)  -    Row Name 09/07/18 1124 09/07/18 0814 09/06/18 1535       How much help from another person do you currently need...    Turning from your back to your side while in flat bed without using bedrails? 3  -MJ  -- 3  -LR    Moving from lying on back to sitting on the side of a flat bed without bedrails? 3  -MJ  -- 3  -LR    Moving to and from a bed to a chair (including a wheelchair)? 3  -MJ  -- 3  -LR    Standing up from a chair using your arms (e.g., wheelchair, bedside chair)? 3  -MJ  -- 3  -LR    Climbing 3-5 steps with a railing? 3  -MJ  -- 3  -LR    To walk in hospital room? 3  -MJ  -- 3  -LR    AM-PAC 6 Clicks Score 18  -MJ  -- 18  -LR       How much help from another is currently needed...    Putting on and taking off regular lower body clothing?  -- 3  -EDGARDO  --    Bathing (including washing, rinsing, and drying)  -- 3  -EDGARDO  --    Toileting (which includes using toilet bed pan or urinal)  -- 3  -EDGARDO  --    Putting on and taking off regular upper body clothing  -- 3  -EDGARDO  --    Taking care of personal grooming (such as brushing teeth)  -- 4   -EDGARDO  --    Eating meals  -- 4  -EDGARDO  --    Score  -- 20  -EDGARDO  --       Functional Assessment    Outcome Measure Options AM-PAC 6 Clicks Basic Mobility (PT)  -MJ AM-PAC 6 Clicks Daily Activity (OT)  -[place] AM-PAC 6 Clicks Basic Mobility (PT)  -LR      User Key  (r) = Recorded By, (t) = Taken By, (c) = Cosigned By    Initials Name Provider Type    Indira Jonas, OT Occupational Therapist    LR Karin Correia, PT Physical Therapist    Kayce Black, OT Occupational Therapist    Isidro Manning, PT Physical Therapist           Time Calculation:         PT Charges     Row Name 09/09/18 1105             Time Calculation    Start Time 1105  -MJ      PT Received On 09/09/18  -MJ      PT Goal Re-Cert Due Date 09/16/18  -MJ         Time Calculation- PT    Total Timed Code Minutes- PT 24 minute(s)  -MJ         Timed Charges    57555 - PT Therapeutic Exercise Minutes 8  -MJ      15795 - Gait Training Minutes  16  -MJ        User Key  (r) = Recorded By, (t) = Taken By, (c) = Cosigned By    Initials Name Provider Type    Isidro Manning, PT Physical Therapist        Therapy Suggested Charges     Code   Minutes Charges    50927 (CPT®) Hc Pt Neuromusc Re Education Ea 15 Min      29508 (CPT®) Hc Pt Ther Proc Ea 15 Min 8 1    42728 (CPT®) Hc Gait Training Ea 15 Min 16 1    02855 (CPT®) Hc Pt Therapeutic Act Ea 15 Min      82341 (CPT®) Hc Pt Manual Therapy Ea 15 Min      88276 (CPT®) Hc Pt Iontophoresis Ea 15 Min      95019 (CPT®) Hc Pt Elec Stim Ea-Per 15 Min      19611 (CPT®) Hc Pt Ultrasound Ea 15 Min      04545 (CPT®) Hc Pt Self Care/Mgmt/Train Ea 15 Min      45588 (CPT®) Hc Pt Prosthetic (S) Train Initial Encounter, Each 15 Min      83259 (CPT®) Hc Pt Orthotic(S)/Prosthetic(S) Encounter, Each 15 Min      81127 (CPT®) Hc Orthotic(S) Mgmt/Train Initial Encounter, Each 15min      Total  24 2          Therapy Charges for Today     Code Description Service Date Service Provider Modifiers Qty    42785824277 HC GAIT TRAINING  EA 15 MIN 9/8/2018 Isidro Olvera, PT GP 1    [id number] HC PT THER PROC EA 15 MIN 9/9/2018 Isidro Olvera, PT GP 1    [id number] HC GAIT TRAINING EA 15 MIN 9/9/2018 Isidro Olvera, PT GP 1          PT G-Codes  Outcome Measure Options: AM-PAC 6 Clicks Basic Mobility (PT)  AM-PAC 6 Clicks Score: 18  Score: 21         Isidro Olvera, PT  9/9/2018

## 2018-09-10 NOTE — THERAPY DISCHARGE NOTE
Acute Care - Physical Therapy Discharge Summary  [facility]       Patient Name: Marlen Balderrama  : 1960  MRN: [id number]    Today's Date: 9/10/2018  Onset of Illness/Injury or Date of Surgery: 18    Date of Referral to PT: 18  Referring Physician: MD Gm      Admit Date: 2018      PT Recommendation and Plan    Visit Dx:    ICD-10-CM ICD-9-CM   1. Impaired functional mobility, balance, gait, and endurance Z74.09 V49.89   2. Impaired mobility and ADLs Z74.09 799.89   3. S/P lumbar fusion Z98.1 V45.4             Outcome Measures     Row Name 18 1105 18 0956 18 1359       How much help from another person do you currently need...    Turning from your back to your side while in flat bed without using bedrails? 3  -MJ  -- 3  -MJ    Moving from lying on back to sitting on the side of a flat bed without bedrails? 3  -MJ  -- 3  -MJ    Moving to and from a bed to a chair (including a wheelchair)? 3  -MJ  -- 3  -MJ    Standing up from a chair using your arms (e.g., wheelchair, bedside chair)? 3  -MJ  -- 3  -MJ    Climbing 3-5 steps with a railing? 3  -MJ  -- 3  -MJ    To walk in hospital room? 3  -MJ  -- 3  -MJ    AM-PAC 6 Clicks Score 18  -MJ  -- 18  -MJ       How much help from another is currently needed...    Putting on and taking off regular lower body clothing?  -- 3  -MC  --    Bathing (including washing, rinsing, and drying)  -- 3  -MC  --    Toileting (which includes using toilet bed pan or urinal)  -- 3  -MC  --    Putting on and taking off regular upper body clothing  -- 4  -MC  --    Taking care of personal grooming (such as brushing teeth)  -- 4  -MC  --    Eating meals  -- 4  -MC  --    Score  -- 21  -MC  --       Functional Assessment    Outcome Measure Options AM-PAC 6 Clicks Basic Mobility (PT)  -MJ AM-PAC 6 Clicks Daily Activity (OT)  - AM-PAC 6 Clicks Basic Mobility (PT)  -    Row Name 18 1124 18 0814          How much help from another person  do you currently need...    Turning from your back to your side while in flat bed without using bedrails? 3  -MJ  --     Moving from lying on back to sitting on the side of a flat bed without bedrails? 3  -MJ  --     Moving to and from a bed to a chair (including a wheelchair)? 3  -MJ  --     Standing up from a chair using your arms (e.g., wheelchair, bedside chair)? 3  -MJ  --     Climbing 3-5 steps with a railing? 3  -MJ  --     To walk in hospital room? 3  -MJ  --     AM-PAC 6 Clicks Score 18  -MJ  --        How much help from another is currently needed...    Putting on and taking off regular lower body clothing?  -- 3  -EDGARDO     Bathing (including washing, rinsing, and drying)  -- 3  -EDGARDO     Toileting (which includes using toilet bed pan or urinal)  -- 3  -EDGARDO     Putting on and taking off regular upper body clothing  -- 3  -EDGARDO     Taking care of personal grooming (such as brushing teeth)  -- 4  -EDGARDO     Eating meals  -- 4  -EDGARDO     Score  -- 20  -EDGARDO        Functional Assessment    Outcome Measure Options AM-PAC 6 Clicks Basic Mobility (PT)  -MJ AM-PAC 6 Clicks Daily Activity (OT)  -EDGARDO       User Key  (r) = Recorded By, (t) = Taken By, (c) = Cosigned By    Initials Name Provider Type    Indira Joans, OT Occupational Therapist    Kayce Black, OT Occupational Therapist    Isidro Manning, PT Physical Therapist            Therapy Suggested Charges     Code   Minutes Charges    62094 (CPT®) Hc Pt Neuromusc Re Education Ea 15 Min      42493 (CPT®) Hc Pt Ther Proc Ea 15 Min 8 1    61851 (CPT®) Hc Gait Training Ea 15 Min 16 1    38325 (CPT®) Hc Pt Therapeutic Act Ea 15 Min      82626 (CPT®) Hc Pt Manual Therapy Ea 15 Min      49377 (CPT®) Hc Pt Iontophoresis Ea 15 Min      73805 (CPT®) Hc Pt Elec Stim Ea-Per 15 Min      28176 (CPT®) Hc Pt Ultrasound Ea 15 Min      27767 (CPT®) Hc Pt Self Care/Mgmt/Train Ea 15 Min      18225 (CPT®) Hc Pt Prosthetic (S) Train Initial Encounter, Each 15 Min      14569 (CPT®) Hc Pt  Orthotic(S)/Prosthetic(S) Encounter, Each 15 Min      94789 (CPT®) Hc Orthotic(S) Mgmt/Train Initial Encounter, Each 15min      Total  24 2                PT Rehab Goals     Row Name 09/10/18 0805             Bed Mobility Goal 1 (PT)    Activity/Assistive Device (Bed Mobility Goal 1, PT) sit to supine/supine to sit  -      [place] Level/Cues Needed (Bed Mobility Goal 1, PT) conditional independence  -MC      Time Frame (Bed Mobility Goal 1, PT) long term goal (LTG);3 days  -      Progress/Outcomes (Bed Mobility Goal 1, PT) goal not met;discharged from facility  -         Transfer Goal 1 (PT)    Activity/Assistive Device (Transfer Goal 1, PT) sit-to-stand/stand-to-sit;walker, rolling  -      [place] Level/Cues Needed (Transfer Goal 1, PT) conditional independence  -MC      Time Frame (Transfer Goal 1, PT) long term goal (LTG);3 days  -      Progress/Outcome (Transfer Goal 1, PT) goal not met;discharged from facility  -         Gait Training Goal 1 (PT)    Activity/Assistive Device (Gait Training Goal 1, PT) gait (walking locomotion);walker, rolling  -      [place] Level (Gait Training Goal 1, PT) conditional independence  -      Distance (Gait Goal 1, PT) 500 feet  -      Time Frame (Gait Training Goal 1, PT) long term goal (LTG);3 days  -      Progress/Outcome (Gait Training Goal 1, PT) goal partially met   achieved distance  -         Stairs Goal 1 (PT)    Activity/Assistive Device (Stairs Goal 1, PT) ascending stairs;descending stairs;step-to-step;walker, rolling;other (see comments)   backwards  -      [place] Level/Cues Needed (Stairs Goal 1, PT) contact guard assist  -      Number of Stairs (Stairs Goal 1, PT) 1  -      Time Frame (Stairs Goal 1, PT) long term goal (LTG);3 days  -      Progress/Outcome (Stairs Goal 1, PT) goal met  -        User Key  (r) = Recorded By, (t) = Taken By, (c) = Cosigned By    Initials Name Provider Type Discipline    RIA Mejia  Jia MERRITT PT Physical Therapist PT              PT Discharge Summary  Anticipated Discharge Disposition (PT): home with assist, home with home health  Reason for Discharge: Discharge from facility  Outcomes Achieved: Refer to plan of care for updates on goals achieved      Jia Mejia, PT   9/10/2018

## 2018-09-11 NOTE — DISCHARGE PLACEMENT REQUEST
"David Balderrama (58 y.o. Female)     Date of Birth Social Security Number Address Home Phone MRN    1960  [address]  [place] [address] [phone] [id number]    [employer] Marital Status          Congregation        Admission Date Admission Type Admitting Provider Attending Provider Department, Room/Bed    18 Elective Rogelio Martinez MD  [facility] 3G, S362/1    Discharge Date Discharge Disposition Discharge Destination        2018 Home or Self Care Home             Attending Provider:  (none)   Allergies:  Medrol [Methylprednisolone]    Isolation:  None   Infection:  None   Code Status:  Prior    Ht:  167.6 cm (66\")   Wt:  52.7 kg (116 lb 2.9 oz)    Admission Cmt:  None   Principal Problem:  S/P lumbar fusion [Z98.1]                 Active Insurance as of 2018     Primary Coverage     Payor Plan Insurance Group Employer/Plan Group    [employer] PPO [id number]     Payor Plan Address Payor Plan Phone Number Effective From Effective To    [address] [phone] 2018     [facility] [id number]       Subscriber Name Subscriber Birth Date Member ID       DAVID BALDERRAMA 1960 [id number]                 Emergency Contacts      (Rel.) Home Phone Work Phone Mobile Phone    AriannajamesMarco (Spouse) -- -- [phone]               Discharge Summary      Keyla Peralta APRN at 2018 10:31 AM          Patient Name: David Balderrama  MRN: [id number]  : 1960  DOS: 2018    Attending: Rogelio Martinez MD    Primary Care Provider: Provider, No Known    Date of Admission:.2018  5:55 AM    Date of Discharge:  2018    Discharge Diagnosis: Principal Problem:    S/P lumbar fusion  Active Problems:    Back pain    Tobacco abuse    Prediabetes    Acute blood loss anemia, mild, asymptomatic    Acute postoperative pain      Hospital Course  Patient is a 58 y.o. female presented for lumbar fusion by Dr." Juan under [place]. She tolerated surgery well and was admitted for further medical management. Her back has been painful for several months with pain, numbness and tingling down the right leg. She denies saddle anesthesia.      Patient was provided pain medications as needed for pain control.    Adjustments were made to pain medications to optimize postop pain management. Risks and benefits of opiate medications discussed with patient.    She was seen by PT and has progressed well over her stay.  She used an IS for atelectasis prophylaxis and mechanicals for DVT prophylaxis.  Home medications were resumed as appropriate, and labs were monitored and remained fairly stable.     With the progress she has made, she is ready for DC home today.    Discussed with patient regarding plan and she shows understanding and agreement.    Patient will have HHPT following discharge.      Procedures Performed  PREOPERATIVE DIAGNOSES:   1. Lumbar spinal stenosis L3-L4 and L4-L5.   2. 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   3. Degenerative scoliosis L3-L4-L5.   4. Severe spondylosis/disc degeneration L3-L4 and L4-L5.      POSTOPERATIVE DIAGNOSES:   1. Lumbar spinal stenosis L3-L4 and L4-L5.   2. 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   3. Degenerative scoliosis L3-L4-L5.   4. Severe spondylosis/disc degeneration L3-L4 and L4-L5.      PROCEDURES PERFORMED:   1. Decompressive laminectomy, medial facetectomy and foraminotomies to decompress neural elements at L3-L4 and L4-L5.   2. Anterior interbody fusion L3-L4 and L4-L5 with interbody fusion cage and bone graft.   3. Segmental instrumentation L3-L4-L5 with DePuy transpedicular fixation.   4. Posterolateral fusion L3-L4 and L4-L5 with bone graft.   5. Bone marrow aspiration through separate incision in the left posterior iliac crest.      SURGEON: Rogelio Martinez MD       Pertinent Test Results:    I reviewed the patient's new clinical  results.     Results from last 7 days  Lab Units 18  0742 18  1331   WBC 10*3/mm3 10.73 7.91   HEMOGLOBIN g/dL 10.0* 14.6   HEMATOCRIT % 30.3* 44.6*   PLATELETS 10*3/mm3 202 300       Results from last 7 days  Lab Units 18  0535 18  1331   SODIUM mmol/L 136  --    POTASSIUM mmol/L 4.5  --    CHLORIDE mmol/L 104  --    CO2 mmol/L 27.0  --    BUN mg/dL 7*  --    CREATININE mg/dL 0.73  --    CALCIUM mg/dL 8.4*  --    GLUCOSE mg/dL 97 108*     Results for DAVID VILLALPANDO (MRN [id number]) as of 2018 10:45   Ref. Range 2018 12:01 2018 15:33 2018 20:33 2018 07:23   Glucose Latest Ref Range: 70 - 130 mg/dL 158 (H) 117 104 114     I reviewed the patient's new imaging including images and reports.      Physical therapy: Pt ambulated 300 feet with CGA and RW, limited by fatigue. Pt anticipates discharge home tomorrow, will continue to progress mobility as able.    Discharge Assessment:    Vital Signs  /58 (BP Location: Right arm, Patient Position: Lying)   Pulse 101   Temp 98.1 °F (36.7 °C) (Temporal Artery )   Resp 16   SpO2 95%   Temp (24hrs), Av.4 °F (36.9 °C), Min:98 °F (36.7 °C), Max:99 °F (37.2 °C)      General Appearance:    Alert, cooperative, in no acute distress   Lungs:     Clear to auscultation,respirations regular, even and                   unlabored    Heart:    Regular rhythm and normal rate, normal S1 and S2   Abdomen:     Normal bowel sounds, no masses, no organomegaly, soft        non-tender, non-distended, no guarding, no rebound                 tenderness   Extremities:   Moves all extremities well, no edema, no cyanosis, no              redness   Pulses:   Pulses palpable and equal bilaterally   Skin:   No bleeding, bruising or rash. Back dressing CDI   Neurologic:   Cranial nerves 2 - 12 grossly intact, sensation intact. Flexion and dorsiflexion intact bilateral feet.       Discharge Disposition: Home    Discharge Medications     Discharge  Medications      New Medications      Instructions Start Date   docusate sodium 100 MG capsule  Commonly known as:  COLACE   100 mg, Oral, 2 Times Daily      oxyCODONE-acetaminophen 7.5-325 MG per tablet  Commonly known as:  PERCOCET   1 tablet, Oral, Every 6 Hours PRN         Continue These Medications      Instructions Start Date   b complex-C-folic acid 1 MG capsule   1 capsule, Oral, Daily      calcium carbonate 600 MG tablet  Commonly known as:  OS-KORY   1,200 mg, Oral, Daily      cholecalciferol 1000 units tablet  Commonly known as:  VITAMIN D3   2,000 Units, Oral, Daily             Discharge Diet: Consistent carb diet    Activity at Discharge: ambulate    Follow-up Appointments  Dr. Martinez per his orders      DENY Hudson  09/09/18  10:47 AM    Electronically signed by Keyla Peralta APRN at 9/9/2018 10:49 AM

## (undated) DEVICE — SYR LL 10ML LF

## (undated) DEVICE — DISPOSABLE BIPOLAR FORCEPS 7 3/4" (19.7CM) SCOVILLE BAYONET, INSULATED, 1.5MM TIP AND 12 FT. (3.6M) CABLE: Brand: KIRWAN

## (undated) DEVICE — MEDI-VAC YANKAUER SUCTION HANDLE W/BULBOUS TIP: Brand: CARDINAL HEALTH

## (undated) DEVICE — 2963 MEDIPORE SOFT CLOTH TAPE 3 IN X 10 YD 12 RLS/CS: Brand: 3M™ MEDIPORE™

## (undated) DEVICE — DRAPE,REIN 53X77,STERILE: Brand: MEDLINE

## (undated) DEVICE — IRRISEPT WOUND DEBRIDMENT AND CLEANSING SYSTEM: Brand: IRRISEPT

## (undated) DEVICE — OIL CARTRIDGE: Brand: CORE, MAESTRO

## (undated) DEVICE — GOWN,REINF,POLY,ECL,PP SLV,3XL,XLONG: Brand: MEDLINE

## (undated) DEVICE — SUCTION CANISTER, 2500CC, RIGID: Brand: DEROYAL

## (undated) DEVICE — KT DRN EVAC WND PVC PCH WTROC RND 10F400

## (undated) DEVICE — ANTIBACTERIAL UNDYED BRAIDED (POLYGLACTIN 910), SYNTHETIC ABSORBABLE SUTURE: Brand: COATED VICRYL

## (undated) DEVICE — THE MILL DISPOSABLE - MEDIUM

## (undated) DEVICE — AIRWY 90MM NO9

## (undated) DEVICE — SNAP KOVER: Brand: UNBRANDED

## (undated) DEVICE — SOL LR 1000ML

## (undated) DEVICE — GOWN,REINF,POLY,ECL,PP SLV,XL: Brand: MEDLINE

## (undated) DEVICE — GLV SURG SENSICARE W/ALOE PF LF 9 STRL

## (undated) DEVICE — PAD ARMBRD SURG CONVOL 7.5X20X2IN

## (undated) DEVICE — MEDI-VAC NON-CONDUCTIVE SUCTION TUBING: Brand: CARDINAL HEALTH

## (undated) DEVICE — DRSNG TELFA PAD NONADH STR 1S 3X8IN

## (undated) DEVICE — DRP C/ARMOR

## (undated) DEVICE — TOTAL TRAY, 16FR 10ML SIL FOLEY, URN: Brand: MEDLINE

## (undated) DEVICE — SPNG GZ STRL 2S 4X4 12PLY

## (undated) DEVICE — CANNULA,OXY,ADULT,SUPERSOFT,W/7'TUB,UC: Brand: MEDLINE

## (undated) DEVICE — CVR HNDL LT SURG ACCSSRY BLU STRL

## (undated) DEVICE — INTENDED USE FOR SURGICAL MARKING ON INTACT SKIN, ALSO PROVIDES A PERMANENT METHOD OF IDENTIFYING OBJECTS IN THE OPERATING ROOM: Brand: WRITESITE® REGULAR TIP SKIN MARKER

## (undated) DEVICE — GOWN,PREVENTION PLUS,XXLARGE,STERILE: Brand: MEDLINE

## (undated) DEVICE — COVER,LIGHT HANDLE,FLX,1/PK: Brand: MEDLINE INDUSTRIES, INC.

## (undated) DEVICE — DIFFUSER: Brand: CORE, MAESTRO

## (undated) DEVICE — PK SPINE ORTHO 10

## (undated) DEVICE — JACKSON TABLE POSITIONER KIT: Brand: MEDLINE INDUSTRIES, INC.

## (undated) DEVICE — 3.0MM PRECISION NEURO (MATCH HEAD)

## (undated) DEVICE — GAUZE,SPONGE,4"X4",16PLY,XRAY,STRL,LF: Brand: MEDLINE

## (undated) DEVICE — MEDI-VAC YANKAUER SUCTION HANDLE: Brand: CARDINAL HEALTH

## (undated) DEVICE — NDL HYPO ECLPS SFTY 22G 1 1/2IN

## (undated) DEVICE — ADAPT LUER STUB INTRAMEDIC PE/200 17G